# Patient Record
Sex: MALE | Race: WHITE | Employment: OTHER | URBAN - METROPOLITAN AREA
[De-identification: names, ages, dates, MRNs, and addresses within clinical notes are randomized per-mention and may not be internally consistent; named-entity substitution may affect disease eponyms.]

---

## 2021-04-23 ENCOUNTER — APPOINTMENT (OUTPATIENT)
Dept: CT IMAGING | Age: 56
DRG: 177 | End: 2021-04-23
Attending: EMERGENCY MEDICINE
Payer: COMMERCIAL

## 2021-04-23 ENCOUNTER — APPOINTMENT (OUTPATIENT)
Dept: GENERAL RADIOLOGY | Age: 56
DRG: 177 | End: 2021-04-23
Attending: EMERGENCY MEDICINE
Payer: COMMERCIAL

## 2021-04-23 ENCOUNTER — HOSPITAL ENCOUNTER (INPATIENT)
Age: 56
LOS: 4 days | Discharge: HOME | DRG: 177 | End: 2021-04-27
Attending: EMERGENCY MEDICINE | Admitting: FAMILY MEDICINE
Payer: COMMERCIAL

## 2021-04-23 DIAGNOSIS — R77.8 ELEVATED TROPONIN: ICD-10-CM

## 2021-04-23 DIAGNOSIS — I26.09 OTHER ACUTE PULMONARY EMBOLISM WITH ACUTE COR PULMONALE (HCC): ICD-10-CM

## 2021-04-23 DIAGNOSIS — U07.1 PNEUMONIA DUE TO COVID-19 VIRUS: Primary | ICD-10-CM

## 2021-04-23 DIAGNOSIS — J12.82 PNEUMONIA DUE TO COVID-19 VIRUS: Primary | ICD-10-CM

## 2021-04-23 DIAGNOSIS — N17.9 AKI (ACUTE KIDNEY INJURY) (HCC): ICD-10-CM

## 2021-04-23 DIAGNOSIS — I50.9 ACUTE HEART FAILURE, UNSPECIFIED HEART FAILURE TYPE (HCC): ICD-10-CM

## 2021-04-23 PROBLEM — I26.99 ACUTE PULMONARY EMBOLISM (HCC): Status: ACTIVE | Noted: 2021-04-23

## 2021-04-23 LAB
ALBUMIN SERPL-MCNC: 3 G/DL (ref 3.5–5)
ALBUMIN/GLOB SERPL: 0.7 {RATIO} (ref 1.1–2.2)
ALP SERPL-CCNC: 77 U/L (ref 45–117)
ALT SERPL-CCNC: 25 U/L (ref 12–78)
ANION GAP SERPL CALC-SCNC: 15 MMOL/L (ref 5–15)
APTT PPP: 23.1 SEC (ref 22.1–31)
APTT PPP: 43.5 SEC (ref 22.1–31)
AST SERPL-CCNC: 18 U/L (ref 15–37)
ATRIAL RATE: 79 BPM
BASOPHILS # BLD: 0 K/UL (ref 0–0.1)
BASOPHILS NFR BLD: 0 % (ref 0–1)
BILIRUB SERPL-MCNC: 0.8 MG/DL (ref 0.2–1)
BNP SERPL-MCNC: 5160 PG/ML (ref 0–125)
BUN SERPL-MCNC: 27 MG/DL (ref 6–20)
BUN/CREAT SERPL: 14 (ref 12–20)
CALCIUM SERPL-MCNC: 8 MG/DL (ref 8.5–10.1)
CALCULATED P AXIS, ECG09: 24 DEGREES
CALCULATED R AXIS, ECG10: 30 DEGREES
CALCULATED T AXIS, ECG11: 2 DEGREES
CHLORIDE SERPL-SCNC: 100 MMOL/L (ref 97–108)
CO2 SERPL-SCNC: 21 MMOL/L (ref 21–32)
COMMENT, HOLDF: NORMAL
CREAT SERPL-MCNC: 1.88 MG/DL (ref 0.7–1.3)
CRP SERPL-MCNC: 9.8 MG/DL
D DIMER PPP FEU-MCNC: 11.56 MG/L FEU (ref 0–0.65)
DIAGNOSIS, 93000: NORMAL
DIFFERENTIAL METHOD BLD: ABNORMAL
EOSINOPHIL # BLD: 0.1 K/UL (ref 0–0.4)
EOSINOPHIL NFR BLD: 1 % (ref 0–7)
ERYTHROCYTE [DISTWIDTH] IN BLOOD BY AUTOMATED COUNT: 12.6 % (ref 11.5–14.5)
ERYTHROCYTE [SEDIMENTATION RATE] IN BLOOD: 47 MM/HR (ref 0–20)
FERRITIN SERPL-MCNC: 78 NG/ML (ref 26–388)
GLOBULIN SER CALC-MCNC: 4.2 G/DL (ref 2–4)
GLUCOSE SERPL-MCNC: 175 MG/DL (ref 65–100)
HCT VFR BLD AUTO: 42.4 % (ref 36.6–50.3)
HGB BLD-MCNC: 13.9 G/DL (ref 12.1–17)
IMM GRANULOCYTES # BLD AUTO: 0.1 K/UL (ref 0–0.04)
IMM GRANULOCYTES NFR BLD AUTO: 1 % (ref 0–0.5)
LACTATE SERPL-SCNC: 1.4 MMOL/L (ref 0.4–2)
LACTATE SERPL-SCNC: 2.5 MMOL/L (ref 0.4–2)
LYMPHOCYTES # BLD: 1.1 K/UL (ref 0.8–3.5)
LYMPHOCYTES NFR BLD: 9 % (ref 12–49)
MCH RBC QN AUTO: 28.2 PG (ref 26–34)
MCHC RBC AUTO-ENTMCNC: 32.8 G/DL (ref 30–36.5)
MCV RBC AUTO: 86 FL (ref 80–99)
MONOCYTES # BLD: 0.7 K/UL (ref 0–1)
MONOCYTES NFR BLD: 6 % (ref 5–13)
NEUTS SEG # BLD: 9.5 K/UL (ref 1.8–8)
NEUTS SEG NFR BLD: 83 % (ref 32–75)
NRBC # BLD: 0 K/UL (ref 0–0.01)
NRBC BLD-RTO: 0 PER 100 WBC
P-R INTERVAL, ECG05: 176 MS
PLATELET # BLD AUTO: 166 K/UL (ref 150–400)
PMV BLD AUTO: 10.1 FL (ref 8.9–12.9)
POTASSIUM SERPL-SCNC: 3.7 MMOL/L (ref 3.5–5.1)
PROT SERPL-MCNC: 7.2 G/DL (ref 6.4–8.2)
Q-T INTERVAL, ECG07: 396 MS
QRS DURATION, ECG06: 86 MS
QTC CALCULATION (BEZET), ECG08: 454 MS
RBC # BLD AUTO: 4.93 M/UL (ref 4.1–5.7)
SAMPLES BEING HELD,HOLD: NORMAL
SODIUM SERPL-SCNC: 136 MMOL/L (ref 136–145)
THERAPEUTIC RANGE,PTTT: ABNORMAL SECS (ref 58–77)
THERAPEUTIC RANGE,PTTT: NORMAL SECS (ref 58–77)
TROPONIN I SERPL-MCNC: 0.16 NG/ML
VENTRICULAR RATE, ECG03: 79 BPM
WBC # BLD AUTO: 11.4 K/UL (ref 4.1–11.1)

## 2021-04-23 PROCEDURE — 85025 COMPLETE CBC W/AUTO DIFF WBC: CPT

## 2021-04-23 PROCEDURE — 74011250636 HC RX REV CODE- 250/636: Performed by: FAMILY MEDICINE

## 2021-04-23 PROCEDURE — 93005 ELECTROCARDIOGRAM TRACING: CPT

## 2021-04-23 PROCEDURE — 36415 COLL VENOUS BLD VENIPUNCTURE: CPT

## 2021-04-23 PROCEDURE — 82728 ASSAY OF FERRITIN: CPT

## 2021-04-23 PROCEDURE — 83605 ASSAY OF LACTIC ACID: CPT

## 2021-04-23 PROCEDURE — 71275 CT ANGIOGRAPHY CHEST: CPT

## 2021-04-23 PROCEDURE — 65660000000 HC RM CCU STEPDOWN

## 2021-04-23 PROCEDURE — 74011000258 HC RX REV CODE- 258: Performed by: FAMILY MEDICINE

## 2021-04-23 PROCEDURE — 85379 FIBRIN DEGRADATION QUANT: CPT

## 2021-04-23 PROCEDURE — 86140 C-REACTIVE PROTEIN: CPT

## 2021-04-23 PROCEDURE — 85652 RBC SED RATE AUTOMATED: CPT

## 2021-04-23 PROCEDURE — 74011250637 HC RX REV CODE- 250/637: Performed by: FAMILY MEDICINE

## 2021-04-23 PROCEDURE — 71045 X-RAY EXAM CHEST 1 VIEW: CPT

## 2021-04-23 PROCEDURE — 84484 ASSAY OF TROPONIN QUANT: CPT

## 2021-04-23 PROCEDURE — 85730 THROMBOPLASTIN TIME PARTIAL: CPT

## 2021-04-23 PROCEDURE — 80053 COMPREHEN METABOLIC PANEL: CPT

## 2021-04-23 PROCEDURE — 74011250636 HC RX REV CODE- 250/636: Performed by: EMERGENCY MEDICINE

## 2021-04-23 PROCEDURE — 74011000636 HC RX REV CODE- 636: Performed by: FAMILY MEDICINE

## 2021-04-23 PROCEDURE — 87040 BLOOD CULTURE FOR BACTERIA: CPT

## 2021-04-23 PROCEDURE — 83880 ASSAY OF NATRIURETIC PEPTIDE: CPT

## 2021-04-23 PROCEDURE — 99285 EMERGENCY DEPT VISIT HI MDM: CPT

## 2021-04-23 RX ORDER — ACETAMINOPHEN 325 MG/1
650 TABLET ORAL
Status: DISCONTINUED | OUTPATIENT
Start: 2021-04-23 | End: 2021-04-27 | Stop reason: HOSPADM

## 2021-04-23 RX ORDER — HEPARIN SODIUM 5000 [USP'U]/ML
40 INJECTION, SOLUTION INTRAVENOUS; SUBCUTANEOUS ONCE
Status: COMPLETED | OUTPATIENT
Start: 2021-04-23 | End: 2021-04-23

## 2021-04-23 RX ORDER — SODIUM CHLORIDE 9 MG/ML
75 INJECTION, SOLUTION INTRAVENOUS CONTINUOUS
Status: DISPENSED | OUTPATIENT
Start: 2021-04-23 | End: 2021-04-24

## 2021-04-23 RX ORDER — SODIUM CHLORIDE 0.9 % (FLUSH) 0.9 %
5-40 SYRINGE (ML) INJECTION AS NEEDED
Status: DISCONTINUED | OUTPATIENT
Start: 2021-04-23 | End: 2021-04-27 | Stop reason: HOSPADM

## 2021-04-23 RX ORDER — HEPARIN SODIUM 10000 [USP'U]/100ML
18-36 INJECTION, SOLUTION INTRAVENOUS
Status: DISCONTINUED | OUTPATIENT
Start: 2021-04-23 | End: 2021-04-23 | Stop reason: SDUPTHER

## 2021-04-23 RX ORDER — ASCORBIC ACID 500 MG
500 TABLET ORAL 2 TIMES DAILY
Status: DISCONTINUED | OUTPATIENT
Start: 2021-04-23 | End: 2021-04-27 | Stop reason: HOSPADM

## 2021-04-23 RX ORDER — ZINC SULFATE 50(220)MG
1 CAPSULE ORAL DAILY
Status: DISCONTINUED | OUTPATIENT
Start: 2021-04-24 | End: 2021-04-27 | Stop reason: HOSPADM

## 2021-04-23 RX ORDER — HEPARIN SODIUM 10000 [USP'U]/100ML
12-36 INJECTION, SOLUTION INTRAVENOUS
Status: DISCONTINUED | OUTPATIENT
Start: 2021-04-23 | End: 2021-04-26

## 2021-04-23 RX ORDER — ONDANSETRON 2 MG/ML
4 INJECTION INTRAMUSCULAR; INTRAVENOUS
Status: DISCONTINUED | OUTPATIENT
Start: 2021-04-23 | End: 2021-04-27 | Stop reason: HOSPADM

## 2021-04-23 RX ORDER — DEXAMETHASONE SODIUM PHOSPHATE 4 MG/ML
6 INJECTION, SOLUTION INTRA-ARTICULAR; INTRALESIONAL; INTRAMUSCULAR; INTRAVENOUS; SOFT TISSUE EVERY 24 HOURS
Status: DISCONTINUED | OUTPATIENT
Start: 2021-04-23 | End: 2021-04-27 | Stop reason: HOSPADM

## 2021-04-23 RX ORDER — HEPARIN SODIUM 5000 [USP'U]/ML
80 INJECTION, SOLUTION INTRAVENOUS; SUBCUTANEOUS ONCE
Status: COMPLETED | OUTPATIENT
Start: 2021-04-23 | End: 2021-04-23

## 2021-04-23 RX ORDER — SODIUM CHLORIDE 0.9 % (FLUSH) 0.9 %
5-10 SYRINGE (ML) INJECTION AS NEEDED
Status: DISCONTINUED | OUTPATIENT
Start: 2021-04-23 | End: 2021-04-27 | Stop reason: HOSPADM

## 2021-04-23 RX ORDER — ACETAMINOPHEN 650 MG/1
650 SUPPOSITORY RECTAL
Status: DISCONTINUED | OUTPATIENT
Start: 2021-04-23 | End: 2021-04-27 | Stop reason: HOSPADM

## 2021-04-23 RX ORDER — GUAIFENESIN/DEXTROMETHORPHAN 100-10MG/5
5 SYRUP ORAL
Status: DISCONTINUED | OUTPATIENT
Start: 2021-04-23 | End: 2021-04-27 | Stop reason: HOSPADM

## 2021-04-23 RX ORDER — SODIUM CHLORIDE 0.9 % (FLUSH) 0.9 %
5-40 SYRINGE (ML) INJECTION EVERY 8 HOURS
Status: DISCONTINUED | OUTPATIENT
Start: 2021-04-23 | End: 2021-04-27 | Stop reason: HOSPADM

## 2021-04-23 RX ADMIN — OXYCODONE HYDROCHLORIDE AND ACETAMINOPHEN 500 MG: 500 TABLET ORAL at 19:30

## 2021-04-23 RX ADMIN — HEPARIN SODIUM 4850 UNITS: 5000 INJECTION INTRAVENOUS; SUBCUTANEOUS at 22:14

## 2021-04-23 RX ADMIN — HEPARIN SODIUM 12 UNITS/KG/HR: 10000 INJECTION, SOLUTION INTRAVENOUS at 13:44

## 2021-04-23 RX ADMIN — DEXAMETHASONE SODIUM PHOSPHATE 6 MG: 4 INJECTION, SOLUTION INTRA-ARTICULAR; INTRALESIONAL; INTRAMUSCULAR; INTRAVENOUS; SOFT TISSUE at 19:30

## 2021-04-23 RX ADMIN — CEFTRIAXONE SODIUM 1 G: 1 INJECTION, POWDER, FOR SOLUTION INTRAMUSCULAR; INTRAVENOUS at 19:30

## 2021-04-23 RX ADMIN — AZITHROMYCIN MONOHYDRATE 500 MG: 500 INJECTION, POWDER, LYOPHILIZED, FOR SOLUTION INTRAVENOUS at 22:16

## 2021-04-23 RX ADMIN — SODIUM CHLORIDE 1000 ML: 9 INJECTION, SOLUTION INTRAVENOUS at 11:04

## 2021-04-23 RX ADMIN — IOPAMIDOL 80 ML: 755 INJECTION, SOLUTION INTRAVENOUS at 13:00

## 2021-04-23 RX ADMIN — SODIUM CHLORIDE 75 ML/HR: 9 INJECTION, SOLUTION INTRAVENOUS at 19:24

## 2021-04-23 RX ADMIN — HEPARIN SODIUM 9750 UNITS: 5000 INJECTION INTRAVENOUS; SUBCUTANEOUS at 13:42

## 2021-04-23 NOTE — ED PROVIDER NOTES
The history is provided by the patient. No  was used. Fatigue  This is a new problem. The current episode started more than 2 days ago. The problem has been gradually worsening. There was no focality noted. Pertinent negatives include no focal weakness, no loss of sensation, no loss of balance, no slurred speech, no speech difficulty, no memory loss, no movement disorder, no agitation, no visual change, no auditory change, no mental status change, no unresponsiveness and no disorientation. There has been no fever. Associated symptoms include shortness of breath. Pertinent negatives include no chest pain, no vomiting, no altered mental status, no confusion, no headaches, no choking, no nausea, no bowel incontinence and no bladder incontinence. Past Medical History:   Diagnosis Date    Hypertension        Past Surgical History:   Procedure Laterality Date    HX ORTHOPAEDIC      shoulder reconstruction         History reviewed. No pertinent family history.     Social History     Socioeconomic History    Marital status: Not on file     Spouse name: Not on file    Number of children: Not on file    Years of education: Not on file    Highest education level: Not on file   Occupational History    Not on file   Social Needs    Financial resource strain: Not on file    Food insecurity     Worry: Not on file     Inability: Not on file    Transportation needs     Medical: Not on file     Non-medical: Not on file   Tobacco Use    Smoking status: Never Smoker    Smokeless tobacco: Never Used   Substance and Sexual Activity    Alcohol use: Yes     Comment: social    Drug use: Never    Sexual activity: Not on file   Lifestyle    Physical activity     Days per week: Not on file     Minutes per session: Not on file    Stress: Not on file   Relationships    Social connections     Talks on phone: Not on file     Gets together: Not on file     Attends Hinduism service: Not on file Active member of club or organization: Not on file     Attends meetings of clubs or organizations: Not on file     Relationship status: Not on file    Intimate partner violence     Fear of current or ex partner: Not on file     Emotionally abused: Not on file     Physically abused: Not on file     Forced sexual activity: Not on file   Other Topics Concern    Not on file   Social History Narrative    Not on file         ALLERGIES: Patient has no known allergies. Review of Systems   Constitutional: Positive for fatigue. Negative for activity change, chills and fever. HENT: Negative for nosebleeds, sore throat, trouble swallowing and voice change. Eyes: Negative for visual disturbance. Respiratory: Positive for shortness of breath. Negative for choking. Cardiovascular: Negative for chest pain and palpitations. Gastrointestinal: Positive for diarrhea. Negative for abdominal pain, bowel incontinence, constipation, nausea and vomiting. Genitourinary: Negative for bladder incontinence, difficulty urinating, dysuria, hematuria and urgency. Musculoskeletal: Negative for back pain, neck pain and neck stiffness. Skin: Negative for color change. Allergic/Immunologic: Negative for immunocompromised state. Neurological: Negative for dizziness, focal weakness, seizures, syncope, speech difficulty, weakness, light-headedness, numbness, headaches and loss of balance. Psychiatric/Behavioral: Negative for agitation, behavioral problems, confusion, hallucinations, memory loss, self-injury and suicidal ideas. Vitals:    04/23/21 1024 04/23/21 1030   BP: (!) 84/70 90/70   Pulse: 85    Resp: 24    Temp: 97.8 °F (36.6 °C)    SpO2: 96% 95%   Weight: 121.8 kg (268 lb 8.3 oz)             Physical Exam  Vitals signs and nursing note reviewed. Constitutional:       General: He is not in acute distress. Appearance: He is well-developed. He is not diaphoretic.    HENT:      Head: Normocephalic and atraumatic. Eyes:      Pupils: Pupils are equal, round, and reactive to light. Neck:      Musculoskeletal: Normal range of motion and neck supple. Cardiovascular:      Rate and Rhythm: Normal rate and regular rhythm. Heart sounds: Normal heart sounds. No murmur. No friction rub. No gallop. Pulmonary:      Effort: Pulmonary effort is normal. No respiratory distress. Breath sounds: Normal breath sounds. No wheezing. Abdominal:      General: Bowel sounds are normal. There is no distension. Palpations: Abdomen is soft. Tenderness: There is no abdominal tenderness. There is no guarding or rebound. Musculoskeletal: Normal range of motion. Skin:     General: Skin is warm. Findings: No rash. Neurological:      Mental Status: He is alert and oriented to person, place, and time. Psychiatric:         Behavior: Behavior normal.         Thought Content: Thought content normal.         Judgment: Judgment normal.          MDM     This is a 14-year-old male with past medical history, review of systems, physical exam as above, presenting with complaints of shortness of breath, generalized weakness, loose bowel movements, in the setting of recent COVID-19 diagnosis. Patient states tested positive approximately a week ago. He endorses 3-5 loose bowel movements per day, gradual onset exertional dyspnea, progressing to dyspnea at rest, generalized weakness, and exercise intolerance. He endorses ongoing mild nonproductive cough, denies nausea, vomiting, abdominal pain. He states he has a special needs daughter who typically transfers and dresses, however states he is having more more difficulty doing that, stating that while ambulating to the bathroom he can no longer walk but more often crawls. He endorses a history of hypertension, noted to be hypotensive upon arrival, afebrile without tachycardia, satting 95% on room air he endorses dyspnea.   Differential includes sepsis, anemia, heart failure secondary to active Covid infection. Plan to obtain septic work-up, provide limited fluid bolus given possibility for heart failure, chest x-ray, inflammatory markers, D-dimer. We will reassess, and make a disposition, however I anticipate the patient will require admission for further care and evaluation. Procedures    Perfect Serve Consult for Admission  11:54 AM    ED Room Number: CAM28/14  Patient Name and age:  Fernando Bradshaw 61 y.o.  male  Working Diagnosis:   1. Pneumonia due to COVID-19 virus    2. Acute heart failure, unspecified heart failure type ([address]    3. CHRISTIN (acute kidney injury) ([address]    4. Elevated troponin        COVID-19 Suspicion:  yes  Sepsis present:  yes  Reassessment needed: yes  Code Status:  Full Code  Readmission: no  Isolation Requirements:  yes  Recommended Level of Care:  step down  Department:[facility] Adult ED - 21   Other:  D/w Dr. Ankit Reynafer    1:20 PM  Extensive PE on CTA per radiology, patient d/w with IR who states scattered PE unlikely to benefit from catheter directed therapy, recommends heparin. Hospitalist informed. IMPRESSION:  1. Pneumonia due to COVID-19 virus    2. Acute heart failure, unspecified heart failure type ([address]    3. CHRISTIN (acute kidney injury) ([address]    4. Elevated troponin    5.  Other acute pulmonary embolism with acute cor pulmonale (HCC)        - Broad Spectrum Antibiotics ordered: Viral illness  - Repeat lactic acid ordered for time 1300  - Re-assessment performed at time 1600 and clinical condition improving.    - Hypotension or Lactic Acidosis present (SBP<90, MAP<65, Lactate >4): NO IVF:  Not given due to concerns for volume overload  - Persistent Hypotension despite IVF resuscitation: NO  Vasopressors: Not indicated due to Septic Shock not present    Plan:  Admit to Step down    Total critical care time spent exclusive of procedures:  66 minutes    Cedric Proctor MD

## 2021-04-23 NOTE — ED NOTES
[facility] here to transport patient to [facility]. VSS. Respirations equal and unlabored on 2L NC. IV heparin infusing. Patient in no acute distress upon transfer.

## 2021-04-23 NOTE — H&P
[address] [facility] Adult  Hospitalist Group  History and Physical    Primary Care Provider: Ramya Matt MD  Date of Service:  4/23/2021    Subjective:     Kb Butterfield is a 61 y.o. male who presents via EMS with a chief complaint of ongoing sob and diarrhea x 2 weeks. Was tested at [facility], COVID + yesterday. He also reports fatigue. This is a new problem. The current episode started m2 weeks ago. The problem has been gradually worsening. There was no focality noted. D Dimer was elevated and CTA showing bilat PE. Pt started on heparin drip Pertinent negatives include no focal weakness, no loss of sensation, no loss of balance, no slurred speech, no speech difficulty, no memory loss, no movement disorder, no agitation, no visual change, no auditory change, no mental status change, no unresponsiveness and no disorientation. There has been no fever. Associated symptoms include shortness of breath. Pertinent negatives include no chest pain, no vomiting, no altered mental status, no confusion, no headaches, no choking, no nausea, no bowel incontinence and no bladder incontinence- but he has had diarrhea for over 1 week. Review of Systems:    A comprehensive review of systems was negative except for that written in the History of Present Illness.      Past Medical History:   Diagnosis Date    Hypertension       Past Surgical History:   Procedure Laterality Date    HX ORTHOPAEDIC      shoulder reconstruction     Home medications  -Patient take a blood pressure med- but cant remember the name    No Known Allergies      Family History- mother- HTN, heart issues- pacemaker, stents   Father- HTN, pacemaker   Sister- healthy   No FM of cancer, stroke, MI    SOCIAL HISTORY:  Patient resides at home with his wife- has 2 children- twins  Patient ambulates independently   Smoking history: denies  Alcohol history: rare- social use        Objective:     Patient Vitals for the past 24 hrs:   Temp Pulse Resp BP SpO2   04/23/21 1730 98.4 °F (36.9 °C) 85 20 113/66 98 %   04/23/21 1600 98.4 °F (36.9 °C) 85 23 107/73 96 %   04/23/21 1530 -- 85 26 103/74 --   04/23/21 1500 98.2 °F (36.8 °C) 86 26 107/75 100 %   04/23/21 1400 -- 82 25 96/76 95 %   04/23/21 1330 -- 78 21 104/70 95 %   04/23/21 1230 -- 81 20 96/69 --   04/23/21 1130 -- -- -- 94/66 --   04/23/21 1115 -- -- -- (!) 118/99 94 %   04/23/21 1100 -- -- -- 92/67 95 %   04/23/21 1045 -- -- -- 92/66 92 %   04/23/21 1030 -- -- -- 90/70 95 %   04/23/21 1024 97.8 °F (36.6 °C) 85 24 (!) 84/70 96 %     Physical Exam:   Visit Vitals  /78 (BP 1 Location: Left upper arm, BP Patient Position: At rest)   Pulse 70   Temp 97.8 °F (36.6 °C)   Resp 20   Wt 121.8 kg (268 lb 8.3 oz)   SpO2 97%     General:  Alert, cooperative, no distress, appears fatigued   Head:  Normocephalic, without obvious abnormality, atraumatic. Eyes:  Conjunctivae/corneas clear. PERRL, EOMs intact. Throat: Not examined   Neck: Supple, symmetrical, trachea midline, no adenopathy, thyroid: no enlargement/tenderness/nodules, no carotid bruit and no JVD. Back:   Symmetric, no curvature. ROM normal. No CVA tenderness. Lungs:   Clear to auscultation bilaterally. Chest wall:  No tenderness or deformity. Heart:  Regular rate and rhythm, S1, S2 normal, no murmur,    Abdomen:   Soft, non-tender. Bowel sounds normal. No masses,   Extremities: Extremities normal, atraumatic, no cyanosis or edema. Pulses: 2+ and symmetric all extremities. Skin: Skin color, texture, turgor normal. No rashes or lesions   Lymph nodes: Cervical, supraclavicular, and axillary nodes normal.   Neurologic: CNII-XII intact. Normal strength, sensation throughout. All diagnostic labs and studies have been reviewed.     Recent Results (from the past 24 hour(s))   SAMPLES BEING HELD    Collection Time: 04/23/21 10:35 AM   Result Value Ref Range    SAMPLES BEING HELD  1 RED, 1 BLUE, 1 LAV, 1 GREEN, 1 GRAY ON ICE     COMMENT        Add-on orders for these samples will be processed based on acceptable specimen integrity and analyte stability, which may vary by analyte. CBC WITH AUTOMATED DIFF    Collection Time: 04/23/21 10:35 AM   Result Value Ref Range    WBC 11.4 (H) 4.1 - 11.1 K/uL    RBC 4.93 4.10 - 5.70 M/uL    HGB 13.9 12.1 - 17.0 g/dL    HCT 42.4 36.6 - 50.3 %    MCV 86.0 80.0 - 99.0 FL    MCH 28.2 26.0 - 34.0 PG    MCHC 32.8 30.0 - 36.5 g/dL    RDW 12.6 11.5 - 14.5 %    PLATELET 329 768 - 240 K/uL    MPV 10.1 8.9 - 12.9 FL    NRBC 0.0 0  WBC    ABSOLUTE NRBC 0.00 0.00 - 0.01 K/uL    NEUTROPHILS 83 (H) 32 - 75 %    LYMPHOCYTES 9 (L) 12 - 49 %    MONOCYTES 6 5 - 13 %    EOSINOPHILS 1 0 - 7 %    BASOPHILS 0 0 - 1 %    IMMATURE GRANULOCYTES 1 (H) 0.0 - 0.5 %    ABS. NEUTROPHILS 9.5 (H) 1.8 - 8.0 K/UL    ABS. LYMPHOCYTES 1.1 0.8 - 3.5 K/UL    ABS. MONOCYTES 0.7 0.0 - 1.0 K/UL    ABS. EOSINOPHILS 0.1 0.0 - 0.4 K/UL    ABS. BASOPHILS 0.0 0.0 - 0.1 K/UL    ABS. IMM. GRANS. 0.1 (H) 0.00 - 0.04 K/UL    DF AUTOMATED     LACTIC ACID    Collection Time: 04/23/21 10:35 AM   Result Value Ref Range    Lactic acid 2.5 (HH) 0.4 - 2.0 MMOL/L   TROPONIN I    Collection Time: 04/23/21 10:35 AM   Result Value Ref Range    Troponin-I, Qt. 0.16 (H) <4.26 ng/mL   METABOLIC PANEL, COMPREHENSIVE    Collection Time: 04/23/21 10:35 AM   Result Value Ref Range    Sodium 136 136 - 145 mmol/L    Potassium 3.7 3.5 - 5.1 mmol/L    Chloride 100 97 - 108 mmol/L    CO2 21 21 - 32 mmol/L    Anion gap 15 5 - 15 mmol/L    Glucose 175 (H) 65 - 100 mg/dL    BUN 27 (H) 6 - 20 MG/DL    Creatinine 1.88 (H) 0.70 - 1.30 MG/DL    BUN/Creatinine ratio 14 12 - 20      GFR est AA 45 (L) >60 ml/min/1.73m2    GFR est non-AA 37 (L) >60 ml/min/1.73m2    Calcium 8.0 (L) 8.5 - 10.1 MG/DL    Bilirubin, total 0.8 0.2 - 1.0 MG/DL    ALT (SGPT) 25 12 - 78 U/L    AST (SGOT) 18 15 - 37 U/L    Alk.  phosphatase 77 45 - 117 U/L    Protein, total 7.2 6.4 - 8.2 g/dL    Albumin 3.0 (L) 3.5 - 5.0 g/dL Globulin 4.2 (H) 2.0 - 4.0 g/dL    A-G Ratio 0.7 (L) 1.1 - 2.2     NT-PRO BNP    Collection Time: 04/23/21 10:35 AM   Result Value Ref Range    NT pro-BNP 5,160 (H) 0 - 125 PG/ML   SED RATE (ESR)    Collection Time: 04/23/21 10:35 AM   Result Value Ref Range    Sed rate, automated 47 (H) 0 - 20 mm/hr   C REACTIVE PROTEIN, QT    Collection Time: 04/23/21 10:35 AM   Result Value Ref Range    C-Reactive protein 9.80 (H) <0.60 mg/dL   D DIMER    Collection Time: 04/23/21 10:35 AM   Result Value Ref Range    D-dimer 11.56 (H) 0.00 - 0.65 mg/L FEU   FERRITIN    Collection Time: 04/23/21 10:35 AM   Result Value Ref Range    Ferritin 78 26 - 388 NG/ML   EKG, 12 LEAD, INITIAL    Collection Time: 04/23/21 12:10 PM   Result Value Ref Range    Ventricular Rate 79 BPM    Atrial Rate 79 BPM    P-R Interval 176 ms    QRS Duration 86 ms    Q-T Interval 396 ms    QTC Calculation (Bezet) 454 ms    Calculated P Axis 24 degrees    Calculated R Axis 30 degrees    Calculated T Axis 2 degrees    Diagnosis       ** Poor data quality, interpretation may be adversely affected  Normal sinus rhythm    No previous ECGs available  Confirmed by Oanh Bojorquez M.D., Darina Amend ([id number]) on 4/23/2021 5:51:19 PM     PTT    Collection Time: 04/23/21  1:41 PM   Result Value Ref Range    aPTT 23.1 22.1 - 31.0 sec    aPTT, therapeutic range     58.0 - 77.0 SECS   LACTIC ACID    Collection Time: 04/23/21  1:41 PM   Result Value Ref Range    Lactic acid 1.4 0.4 - 2.0 MMOL/L     CT Results  (Last 48 hours)               04/23/21 1257  CTA CHEST W OR W WO CONT Final result    Impression:  1. Severe bilateral pulmonary emboli with evidence for severe right heart   strain. This case was discussed with Dr. Yasmin Malave. .   2. Bilateral Covid 19 pneumonia is noted. Narrative: Indication: Covid pneumonia, evaluate for pulmonary embolism.        COMPARISON: Chest radiograph 4/23/2021       A precontrast localizer was utilized to determine the level of the pulmonary   arteries. 2.5 mm axial thin sections were obtained following the rapid bolus   administration of 80 cc Isovue-370. Coronal and sagittal reformatted images were   performed. . 3D post processing was performed including MIP imaging. CT dose   reduction was achieved through use of a standardized protocol tailored for this   examination and automatic exposure control for dose modulation. The emergency room was notified. The main pulmonary segment is enlarged measuring 35 mm. There is extensive   bilateral pulmonary emboli. There are pulmonary emboli in both lower lobes that   cause near complete occlusion. There are also pulmonary emboli identified in the   lingula, left upper lobe and right upper lobe and right middle lobe. There is marked enlargement right ventricle diameter right ventricle 6.2 cm the   diameter of the left ventricle is 2.6 cm. There is reflux of contrast material   into the inferior vena cava and hepatic veins. These findings are consistent   with significant right heart strain. The lungs demonstrate bilateral peripheral subpleural areas of groundglass   opacity and consolidation all lobes are involved this is consistent with Covid   19 pneumonia. The central airways are patent. There are very minimal pleural effusions. Thyroid gland is unremarkable. No adenopathy. Imaging through the upper abdomen reveals no adrenal lesions. Osseous structures reveal diffuse idiopathic skeletal hyperostosis. Assessment:     Active Problems:    Pneumonia due to COVID-19 virus (4/23/2021)        Plan:     1. Acute Covid 19 pneumonia-currently on oxygen but no hypoxemia has been documented  Start zinc, vit C, steroids for elevated CRP, heparin for PE, antibiotics, consider remdesivir if hypoxemia documented  Do not see a need for inhalers at this time- pulmonary consult in the AM tomorrow  2.  Bilateral Pulmonary embolism- with evidence of R heart strain  IR was consulted by ER MD and they did not recommend any acute intervention but did recommend Heparin Drip  Did not order an ECHO due to stable BP and So2 and + COVID status  Which was started in the ER and will continue inpatient - consult Pulm for further tx recs  3. HTN with minimal elevation of troponin  - monitor BP and resume home med once we know what he was taking  EKG neg for MI, NSR, repeat troponin in am tomorrow  Minimal elevation likely due to heart strain and low SO2 levels from PE/pneumonia  4. Elevated creatinine- give 1L of fluid and recheck creatinine  Avoid exccessive fluids due to elevated BNP  5.  Diarrhea- likely COVID related- monitor    Full Code  Surrogate decisionmaker- patient's wife    Signed By: Manish Shields MD     April 23, 2021

## 2021-04-23 NOTE — ED TRIAGE NOTES
TRIAGE NOTE: Pt arrives via EMS for ongoing sob and diarrhea x 2 weeks. Was tested at [facility], COVID + yesterday.       Denies fever/ chills

## 2021-04-23 NOTE — PROGRESS NOTES
Pt received from transport from [facility] ED. A&Ox4, moex4, VSS. No complaints at this time. Will continue to monitor.

## 2021-04-23 NOTE — ED NOTES
TRANSFER - OUT REPORT:    Verbal report given to Chantal MCGUIRE(name) on Karla Gaona  being transferred to 207(unit) for routine progression of care       Report consisted of patients Situation, Background, Assessment and   Recommendations(SBAR). Information from the following report(s) SBAR, Kardex, ED Summary, Intake/Output, MAR, Recent Results and Cardiac Rhythm nsr was reviewed with the receiving nurse. Lines:   Peripheral IV 04/23/21 Right Antecubital (Active)       Peripheral IV 04/23/21 Left Antecubital (Active)        Opportunity for questions and clarification was provided.       Patient transported with:   Monitor  O2 @ 2 liters

## 2021-04-24 ENCOUNTER — APPOINTMENT (OUTPATIENT)
Dept: VASCULAR SURGERY | Age: 56
DRG: 177 | End: 2021-04-24
Attending: FAMILY MEDICINE
Payer: COMMERCIAL

## 2021-04-24 LAB
25(OH)D3 SERPL-MCNC: 11 NG/ML (ref 30–100)
ALBUMIN SERPL-MCNC: 2.8 G/DL (ref 3.5–5)
ALBUMIN/GLOB SERPL: 0.7 {RATIO} (ref 1.1–2.2)
ALP SERPL-CCNC: 82 U/L (ref 45–117)
ALT SERPL-CCNC: 26 U/L (ref 12–78)
ANION GAP SERPL CALC-SCNC: 8 MMOL/L (ref 5–15)
APTT PPP: 39.7 SEC (ref 22.1–31)
APTT PPP: 55.4 SEC (ref 22.1–31)
AST SERPL-CCNC: 17 U/L (ref 15–37)
BASOPHILS # BLD: 0 K/UL (ref 0–0.1)
BASOPHILS NFR BLD: 0 % (ref 0–1)
BILIRUB SERPL-MCNC: 0.6 MG/DL (ref 0.2–1)
BNP SERPL-MCNC: 4742 PG/ML
BUN SERPL-MCNC: 27 MG/DL (ref 6–20)
BUN/CREAT SERPL: 24 (ref 12–20)
CALCIUM SERPL-MCNC: 8 MG/DL (ref 8.5–10.1)
CHLORIDE SERPL-SCNC: 109 MMOL/L (ref 97–108)
CO2 SERPL-SCNC: 19 MMOL/L (ref 21–32)
CREAT SERPL-MCNC: 1.13 MG/DL (ref 0.7–1.3)
CRP SERPL-MCNC: 7.24 MG/DL (ref 0–0.6)
DIFFERENTIAL METHOD BLD: ABNORMAL
EOSINOPHIL # BLD: 0 K/UL (ref 0–0.4)
EOSINOPHIL NFR BLD: 0 % (ref 0–7)
ERYTHROCYTE [DISTWIDTH] IN BLOOD BY AUTOMATED COUNT: 12.7 % (ref 11.5–14.5)
GLOBULIN SER CALC-MCNC: 3.9 G/DL (ref 2–4)
GLUCOSE SERPL-MCNC: 151 MG/DL (ref 65–100)
HCT VFR BLD AUTO: 39.5 % (ref 36.6–50.3)
HGB BLD-MCNC: 12.9 G/DL (ref 12.1–17)
IMM GRANULOCYTES # BLD AUTO: 0 K/UL (ref 0–0.04)
IMM GRANULOCYTES NFR BLD AUTO: 0 % (ref 0–0.5)
LYMPHOCYTES # BLD: 0.9 K/UL (ref 0.8–3.5)
LYMPHOCYTES NFR BLD: 9 % (ref 12–49)
MCH RBC QN AUTO: 28.5 PG (ref 26–34)
MCHC RBC AUTO-ENTMCNC: 32.7 G/DL (ref 30–36.5)
MCV RBC AUTO: 87.2 FL (ref 80–99)
MONOCYTES # BLD: 0.4 K/UL (ref 0–1)
MONOCYTES NFR BLD: 4 % (ref 5–13)
NEUTS SEG # BLD: 8 K/UL (ref 1.8–8)
NEUTS SEG NFR BLD: 87 % (ref 32–75)
NRBC # BLD: 0 K/UL (ref 0–0.01)
NRBC BLD-RTO: 0 PER 100 WBC
PLATELET # BLD AUTO: 163 K/UL (ref 150–400)
PMV BLD AUTO: 10 FL (ref 8.9–12.9)
POTASSIUM SERPL-SCNC: 4.1 MMOL/L (ref 3.5–5.1)
PROCALCITONIN SERPL-MCNC: 0.17 NG/ML
PROT SERPL-MCNC: 6.7 G/DL (ref 6.4–8.2)
RBC # BLD AUTO: 4.53 M/UL (ref 4.1–5.7)
SODIUM SERPL-SCNC: 136 MMOL/L (ref 136–145)
THERAPEUTIC RANGE,PTTT: ABNORMAL SECS (ref 58–77)
THERAPEUTIC RANGE,PTTT: ABNORMAL SECS (ref 58–77)
TROPONIN I SERPL-MCNC: 0.08 NG/ML
WBC # BLD AUTO: 9.3 K/UL (ref 4.1–11.1)

## 2021-04-24 PROCEDURE — 93970 EXTREMITY STUDY: CPT

## 2021-04-24 PROCEDURE — 74011000258 HC RX REV CODE- 258: Performed by: FAMILY MEDICINE

## 2021-04-24 PROCEDURE — 84145 PROCALCITONIN (PCT): CPT

## 2021-04-24 PROCEDURE — 84484 ASSAY OF TROPONIN QUANT: CPT

## 2021-04-24 PROCEDURE — 85730 THROMBOPLASTIN TIME PARTIAL: CPT

## 2021-04-24 PROCEDURE — 85025 COMPLETE CBC W/AUTO DIFF WBC: CPT

## 2021-04-24 PROCEDURE — 74011250636 HC RX REV CODE- 250/636: Performed by: INTERNAL MEDICINE

## 2021-04-24 PROCEDURE — 83880 ASSAY OF NATRIURETIC PEPTIDE: CPT

## 2021-04-24 PROCEDURE — 74011250636 HC RX REV CODE- 250/636: Performed by: FAMILY MEDICINE

## 2021-04-24 PROCEDURE — 36415 COLL VENOUS BLD VENIPUNCTURE: CPT

## 2021-04-24 PROCEDURE — 86140 C-REACTIVE PROTEIN: CPT

## 2021-04-24 PROCEDURE — 74011250637 HC RX REV CODE- 250/637: Performed by: INTERNAL MEDICINE

## 2021-04-24 PROCEDURE — 65660000000 HC RM CCU STEPDOWN

## 2021-04-24 PROCEDURE — 82306 VITAMIN D 25 HYDROXY: CPT

## 2021-04-24 PROCEDURE — 80053 COMPREHEN METABOLIC PANEL: CPT

## 2021-04-24 PROCEDURE — 74011250636 HC RX REV CODE- 250/636: Performed by: EMERGENCY MEDICINE

## 2021-04-24 PROCEDURE — 74011250637 HC RX REV CODE- 250/637: Performed by: FAMILY MEDICINE

## 2021-04-24 RX ORDER — LISINOPRIL 20 MG/1
20 TABLET ORAL DAILY
Status: DISCONTINUED | OUTPATIENT
Start: 2021-04-24 | End: 2021-04-25

## 2021-04-24 RX ORDER — PSEUDOEPHED/ACETAMINOPHEN/CPM 30-500-2MG
2 TABLET ORAL
Status: DISCONTINUED | OUTPATIENT
Start: 2021-04-24 | End: 2021-04-27 | Stop reason: HOSPADM

## 2021-04-24 RX ORDER — HEPARIN SODIUM 5000 [USP'U]/ML
9744 INJECTION, SOLUTION INTRAVENOUS; SUBCUTANEOUS ONCE
Status: COMPLETED | OUTPATIENT
Start: 2021-04-24 | End: 2021-04-24

## 2021-04-24 RX ORDER — LISINOPRIL 20 MG/1
20 TABLET ORAL DAILY
COMMUNITY
End: 2021-04-27

## 2021-04-24 RX ADMIN — DEXAMETHASONE SODIUM PHOSPHATE 6 MG: 4 INJECTION, SOLUTION INTRA-ARTICULAR; INTRALESIONAL; INTRAMUSCULAR; INTRAVENOUS; SOFT TISSUE at 18:10

## 2021-04-24 RX ADMIN — Medication 10 ML: at 06:24

## 2021-04-24 RX ADMIN — HEPARIN SODIUM 9744 UNITS: 5000 INJECTION INTRAVENOUS; SUBCUTANEOUS at 16:40

## 2021-04-24 RX ADMIN — LOPERAMIDE HCL 2 MG: 1 SOLUTION ORAL at 19:47

## 2021-04-24 RX ADMIN — OXYCODONE HYDROCHLORIDE AND ACETAMINOPHEN 500 MG: 500 TABLET ORAL at 09:34

## 2021-04-24 RX ADMIN — Medication 10 ML: at 00:40

## 2021-04-24 RX ADMIN — Medication 1 CAPSULE: at 09:34

## 2021-04-24 RX ADMIN — HEPARIN SODIUM 14 UNITS/KG/HR: 10000 INJECTION, SOLUTION INTRAVENOUS at 06:17

## 2021-04-24 RX ADMIN — HEPARIN SODIUM 19 UNITS/KG/HR: 10000 INJECTION, SOLUTION INTRAVENOUS at 19:02

## 2021-04-24 RX ADMIN — LISINOPRIL 20 MG: 20 TABLET ORAL at 18:10

## 2021-04-24 RX ADMIN — ONDANSETRON 4 MG: 2 INJECTION INTRAMUSCULAR; INTRAVENOUS at 18:32

## 2021-04-24 RX ADMIN — AZITHROMYCIN MONOHYDRATE 500 MG: 500 INJECTION, POWDER, LYOPHILIZED, FOR SOLUTION INTRAVENOUS at 18:11

## 2021-04-24 RX ADMIN — OXYCODONE HYDROCHLORIDE AND ACETAMINOPHEN 500 MG: 500 TABLET ORAL at 18:10

## 2021-04-24 RX ADMIN — CEFTRIAXONE SODIUM 1 G: 1 INJECTION, POWDER, FOR SOLUTION INTRAMUSCULAR; INTRAVENOUS at 18:11

## 2021-04-24 NOTE — PROGRESS NOTES
Progress Note    Patient: Sanjiv Butts MRN: [id number]  SSN: [id number]    YOB: 1965  Age: 61 y.o. Sex: male      Admit Date: 4/23/2021    LOS: 2 days      Assessment/plan:  1. Acute bilateral pulmonary embolism. Per radiology report, CT angiogram of the chest showed right heart strain. Case was discussed by emergency department physician, Dr. Nicole Garcia, interventional radiologist who stated that extensive  PE scattered and unlikely to benefit from catheter directed therapy and recommended heparin. Patient remains on IV heparin. Request pulmonology consultation. Request echocardiogram.  2.  COVID-19 virus infection. Patient is on supplemental oxygen and qualifies for Decadron. He is currently on dexamethasone 6 mg IV daily feel that pulmonary embolism contributed significantly to symptoms of dyspnea. Continue contact isolation, droplet isolation. Patient has slightly elevated procalcitonin level of 0.08, and thus has been placed on antibiotics for  superimposed bacterial pulmonary infection. Continue zinc and vitamin C.  3.  Class II obesity. Patient is a candidate for obstructive sleep apnea. 4.  Essential hypertension. Continue home medication of lisinopril 10 mg p.o. daily.       Current Facility-Administered Medications:     lisinopriL (PRINIVIL, ZESTRIL) tablet 20 mg, 20 mg, Oral, DAILY, Caprice Lav H, DO, 20 mg at 04/24/21 1810    loperamide (IMODIUM) 1 mg/7.5 mL oral solution 2 mg, 2 mg, Oral, QID PRN, Caprice Lav H, DO, 2 mg at 04/24/21 1947    sodium chloride (NS) flush 5-10 mL, 5-10 mL, IntraVENous, PRN, DiskinBrianna MD    heparin 25,000 units in D5W 250 ml infusion, 12-36 Units/kg/hr, IntraVENous, TITRATE, DiskinBrianna MD, Stopped at 04/25/21 0005    sodium chloride (NS) flush 5-40 mL, 5-40 mL, IntraVENous, Q8H, Setwart Villanueva MD, 10 mL at 04/24/21 0624    sodium chloride (NS) flush 5-40 mL, 5-40 mL, IntraVENous, PRN, MD Ovidio Brandonin acetaminophen (TYLENOL) tablet 650 mg, 650 mg, Oral, Q4H PRN **OR** acetaminophen (TYLENOL) suppository 650 mg, 650 mg, Rectal, Q6H PRN, Ruslan Amezcua MD    ondansetron [facility]) injection 4 mg, 4 mg, IntraVENous, Q4H PRN, Ruslan Amezcua MD, 4 mg at 04/24/21 1832    guaiFENesin-dextromethorphan (ROBITUSSIN DM) 100-10 mg/5 mL syrup 5 mL, 5 mL, Oral, Q4H PRN, Ruslan Amezcua MD    dexamethasone (DECADRON) 4 mg/mL injection 6 mg, 6 mg, IntraVENous, Q24H, Ruslan Amezcua MD, 6 mg at 04/24/21 1810    zinc sulfate (ZINCATE) 50 mg zinc (220 mg) capsule 1 Cap, 1 Cap, Oral, DAILY, Ruslan Amezcua MD, 1 Cap at 04/24/21 7475    ascorbic acid (vitamin C) (VITAMIN C) tablet 500 mg, 500 mg, Oral, BID, Ruslan Amezcua MD, 500 mg at 04/24/21 1810    cefTRIAXone (ROCEPHIN) 1 g in 0.9% sodium chloride (MBP/ADV) 50 mL MBP, 1 g, IntraVENous, Q24H, Ruslan Amezcua MD, Last Rate: 100 mL/hr at 04/24/21 1811, 1 g at 04/24/21 1811    azithromycin (ZITHROMAX) 500 mg in 0.9% sodium chloride 250 mL (VIAL-MATE), 500 mg, IntraVENous, Q24H, Ruslan Amezcua MD, 500 mg at 04/24/21 1811    Subjective:   Patient was admitted with acute respiratory failure with hypoxia. He was diagnosed with COVID-19 based on outpatient COVID-19 test on Wednesday, April 21, 2021. Patient states that he decided to come to the emergency department on Friday, 4/23/2021, yesterday, because he was becoming so short of breath that he could hardly walk. Patient states that he has had a cough which remains nonproductive. Otherwise, he has had diarrhea 2-3 times a day approximately 1 week. Patient has had fatigue for about 2 weeks. Patient states that he has not had any nausea, vomiting, fever, chills, chest pain, abdominal pain, headache, body aches, wheezing, melena, hematochezia. At time of examination, patient is on supplemental oxygen at 1.5 L/min. Patient requests Imodium for diarrhea.     Objective:     Vitals:    04/24/21 0131 04/24/21 0440 04/24/21 0909 04/24/21 1230   BP: 112/73 116/78 114/77 121/79   Pulse: 74 70 80 75   Resp: 20 20 18 16   Temp: 97.7 °F (36.5 °C) 97.8 °F (36.6 °C) 98.1 °F (36.7 °C) 98.2 °F (36.8 °C)   SpO2: 96% 97% 99% 98%   Oxygen therapy     1.5 L/min        Intake and Output:  Current Shift: No intake/output data recorded. Last three shifts: 04/23 0701 - 04/24 1900  In: 1480 [P.O.:480; I.V.:1000]  Out: 950 [Urine:950]    Physical Exam:   General: In no acute distress. Well developed, well nourished. Head: Normocephalic, atraumatic. Eyes: Anicteric sclera. PERRL. Extraocular muscles intact. ENT: External ears and nose appear normal.  Oral mucosa moist.  Neck: Supple. No jugular venous distention. Heart: Regular rate and rhythm. No murmurs appreciated. Chest: Symmetrical excursion. Clear to auscultation bilaterally. Abdomen: Soft, nontender. No abnormal distention. Bowel sounds are present throughout. Extremities: No gross deformities. No edema, no cyanosis. Feet are warm to touch. Neurological: No lateralizing deficits. Alert, oriented X3. Skin: No jaundice. No rashes. Lab/Data Review:  Recent Results (from the past 24 hour(s))   CBC WITH AUTOMATED DIFF    Collection Time: 04/24/21  4:35 AM   Result Value Ref Range    WBC 9.3 4.1 - 11.1 K/uL    RBC 4.53 4.10 - 5.70 M/uL    HGB 12.9 12.1 - 17.0 g/dL    HCT 39.5 36.6 - 50.3 %    MCV 87.2 80.0 - 99.0 FL    MCH 28.5 26.0 - 34.0 PG    MCHC 32.7 30.0 - 36.5 g/dL    RDW 12.7 11.5 - 14.5 %    PLATELET 659 516 - 233 K/uL    MPV 10.0 8.9 - 12.9 FL    NRBC 0.0 0  WBC    ABSOLUTE NRBC 0.00 0.00 - 0.01 K/uL    NEUTROPHILS 87 (H) 32 - 75 %    LYMPHOCYTES 9 (L) 12 - 49 %    MONOCYTES 4 (L) 5 - 13 %    EOSINOPHILS 0 0 - 7 %    BASOPHILS 0 0 - 1 %    IMMATURE GRANULOCYTES 0 0.0 - 0.5 %    ABS. NEUTROPHILS 8.0 1.8 - 8.0 K/UL    ABS. LYMPHOCYTES 0.9 0.8 - 3.5 K/UL    ABS. MONOCYTES 0.4 0.0 - 1.0 K/UL    ABS. EOSINOPHILS 0.0 0.0 - 0.4 K/UL    ABS.  BASOPHILS 0.0 0.0 - 0.1 K/UL    ABS. IMM. GRANS. 0.0 0.00 - 0.04 K/UL    DF AUTOMATED     METABOLIC PANEL, COMPREHENSIVE    Collection Time: 04/24/21  4:35 AM   Result Value Ref Range    Sodium 136 136 - 145 mmol/L    Potassium 4.1 3.5 - 5.1 mmol/L    Chloride 109 (H) 97 - 108 mmol/L    CO2 19 (L) 21 - 32 mmol/L    Anion gap 8 5 - 15 mmol/L    Glucose 151 (H) 65 - 100 mg/dL    BUN 27 (H) 6 - 20 MG/DL    Creatinine 1.13 0.70 - 1.30 MG/DL    BUN/Creatinine ratio 24 (H) 12 - 20      GFR est AA >60 >60 ml/min/1.73m2    GFR est non-AA >60 >60 ml/min/1.73m2    Calcium 8.0 (L) 8.5 - 10.1 MG/DL    Bilirubin, total 0.6 0.2 - 1.0 MG/DL    ALT (SGPT) 26 12 - 78 U/L    AST (SGOT) 17 15 - 37 U/L    Alk.  phosphatase 82 45 - 117 U/L    Protein, total 6.7 6.4 - 8.2 g/dL    Albumin 2.8 (L) 3.5 - 5.0 g/dL    Globulin 3.9 2.0 - 4.0 g/dL    A-G Ratio 0.7 (L) 1.1 - 2.2     PROCALCITONIN    Collection Time: 04/24/21  4:35 AM   Result Value Ref Range    Procalcitonin 0.17 ng/mL   C REACTIVE PROTEIN, QT    Collection Time: 04/24/21  4:35 AM   Result Value Ref Range    C-Reactive protein 7.24 (H) 0.00 - 0.60 mg/dL   TROPONIN I    Collection Time: 04/24/21  4:35 AM   Result Value Ref Range    Troponin-I, Qt. 0.08 (H) <0.05 ng/mL   VITAMIN D, 25 HYDROXY    Collection Time: 04/24/21  4:35 AM   Result Value Ref Range    Vitamin D 25-Hydroxy 11.0 (L) 30 - 100 ng/mL   NT-PRO BNP    Collection Time: 04/24/21  4:35 AM   Result Value Ref Range    NT pro-BNP 4,742 (H) <125 PG/ML   PTT    Collection Time: 04/24/21  4:35 AM   Result Value Ref Range    aPTT 55.4 (H) 22.1 - 31.0 sec    aPTT, therapeutic range     58.0 - 77.0 SECS   PTT    Collection Time: 04/24/21  2:47 PM   Result Value Ref Range    aPTT 39.7 (H) 22.1 - 31.0 sec    aPTT, therapeutic range     58.0 - 77.0 SECS         Signed By: Radha Lira DO     April 25, 2021

## 2021-04-24 NOTE — ACP (ADVANCE CARE PLANNING)
Advance Care Planning     Advance Care Planning (ACP) Physician/NP/PA Conversation      Date of Conversation: 4/23/2021  Conducted with: Patient with Decision Making Capacity    Healthcare Decision Maker: Patient  Surrogate decision maker- patient's wife    Care Preferences:    Hospitalization: \"If your health worsens and it becomes clear that your chance of recovery is unlikely, what would be your preference regarding hospitalization? \"  The patient would prefer hospitalization. Ventilation: \"If you were unable to breathe on your own and your chance of recovery was unlikely, what would be your preference about the use of a ventilator (breathing machine) if it was available to you? \"   The patient would desire the use of a ventilator. Resuscitation: \"In the event your heart stopped as a result of an underlying serious health condition, would you want attempts to be made to restart your heart, or would you prefer a natural death? \"   Yes, attempt to resuscitate.     Additional topics discussed: treatment goals    Conversation Outcomes / Follow-Up Plan:   ACP complete - no further action today  Reviewed DNR/DNI and patient elects Full Code (Attempt Resuscitation)       Length of Voluntary ACP Conversation in minutes:  5 min    Robyn Rondon MD

## 2021-04-24 NOTE — PROGRESS NOTES
.Bedside and Verbal shift change report given to Kira (oncoming nurse) by Marco Mullins (offgoing nurse). Report included the following information SBAR, Kardex and MAR.

## 2021-04-25 PROBLEM — I10 HTN (HYPERTENSION): Chronic | Status: ACTIVE | Noted: 2021-04-25

## 2021-04-25 PROBLEM — I26.09 ACUTE PULMONARY EMBOLISM WITH ACUTE COR PULMONALE (HCC): Status: ACTIVE | Noted: 2021-04-25

## 2021-04-25 PROBLEM — E66.9 OBESITY (BMI 30-39.9): Chronic | Status: ACTIVE | Noted: 2021-04-25

## 2021-04-25 LAB
ALBUMIN SERPL-MCNC: 2.6 G/DL (ref 3.5–5)
ALBUMIN/GLOB SERPL: 0.7 {RATIO} (ref 1.1–2.2)
ALP SERPL-CCNC: 77 U/L (ref 45–117)
ALT SERPL-CCNC: 33 U/L (ref 12–78)
ANION GAP SERPL CALC-SCNC: 7 MMOL/L (ref 5–15)
APTT PPP: 127.5 SEC (ref 22.1–31)
APTT PPP: 39.3 SEC (ref 22.1–31)
APTT PPP: 39.7 SEC (ref 22.1–31)
APTT PPP: 94.2 SEC (ref 22.1–31)
APTT PPP: 97.6 SEC (ref 22.1–31)
AST SERPL-CCNC: 21 U/L (ref 15–37)
BASOPHILS # BLD: 0 K/UL (ref 0–0.1)
BASOPHILS NFR BLD: 0 % (ref 0–1)
BILIRUB SERPL-MCNC: 0.5 MG/DL (ref 0.2–1)
BNP SERPL-MCNC: 2944 PG/ML
BUN SERPL-MCNC: 24 MG/DL (ref 6–20)
BUN/CREAT SERPL: 23 (ref 12–20)
CALCIUM SERPL-MCNC: 8 MG/DL (ref 8.5–10.1)
CHLORIDE SERPL-SCNC: 108 MMOL/L (ref 97–108)
CO2 SERPL-SCNC: 20 MMOL/L (ref 21–32)
CREAT SERPL-MCNC: 1.06 MG/DL (ref 0.7–1.3)
CRP SERPL-MCNC: 4.12 MG/DL (ref 0–0.6)
DIFFERENTIAL METHOD BLD: ABNORMAL
EOSINOPHIL # BLD: 0 K/UL (ref 0–0.4)
EOSINOPHIL NFR BLD: 0 % (ref 0–7)
ERYTHROCYTE [DISTWIDTH] IN BLOOD BY AUTOMATED COUNT: 12.5 % (ref 11.5–14.5)
GLOBULIN SER CALC-MCNC: 3.9 G/DL (ref 2–4)
GLUCOSE SERPL-MCNC: 196 MG/DL (ref 65–100)
HCT VFR BLD AUTO: 37 % (ref 36.6–50.3)
HGB BLD-MCNC: 12.1 G/DL (ref 12.1–17)
IMM GRANULOCYTES # BLD AUTO: 0.1 K/UL (ref 0–0.04)
IMM GRANULOCYTES NFR BLD AUTO: 1 % (ref 0–0.5)
LYMPHOCYTES # BLD: 0.6 K/UL (ref 0.8–3.5)
LYMPHOCYTES NFR BLD: 6 % (ref 12–49)
MCH RBC QN AUTO: 28 PG (ref 26–34)
MCHC RBC AUTO-ENTMCNC: 32.7 G/DL (ref 30–36.5)
MCV RBC AUTO: 85.6 FL (ref 80–99)
MONOCYTES # BLD: 0.3 K/UL (ref 0–1)
MONOCYTES NFR BLD: 3 % (ref 5–13)
NEUTS SEG # BLD: 9.6 K/UL (ref 1.8–8)
NEUTS SEG NFR BLD: 90 % (ref 32–75)
NRBC # BLD: 0 K/UL (ref 0–0.01)
NRBC BLD-RTO: 0 PER 100 WBC
PLATELET # BLD AUTO: 168 K/UL (ref 150–400)
PMV BLD AUTO: 10 FL (ref 8.9–12.9)
POTASSIUM SERPL-SCNC: 4 MMOL/L (ref 3.5–5.1)
PROT SERPL-MCNC: 6.5 G/DL (ref 6.4–8.2)
RBC # BLD AUTO: 4.32 M/UL (ref 4.1–5.7)
RBC MORPH BLD: ABNORMAL
SODIUM SERPL-SCNC: 135 MMOL/L (ref 136–145)
THERAPEUTIC RANGE,PTTT: ABNORMAL SECS (ref 58–77)
WBC # BLD AUTO: 10.6 K/UL (ref 4.1–11.1)

## 2021-04-25 PROCEDURE — 83880 ASSAY OF NATRIURETIC PEPTIDE: CPT

## 2021-04-25 PROCEDURE — 85730 THROMBOPLASTIN TIME PARTIAL: CPT

## 2021-04-25 PROCEDURE — 86140 C-REACTIVE PROTEIN: CPT

## 2021-04-25 PROCEDURE — 80053 COMPREHEN METABOLIC PANEL: CPT

## 2021-04-25 PROCEDURE — 74011250636 HC RX REV CODE- 250/636: Performed by: EMERGENCY MEDICINE

## 2021-04-25 PROCEDURE — 36415 COLL VENOUS BLD VENIPUNCTURE: CPT

## 2021-04-25 PROCEDURE — 74011250637 HC RX REV CODE- 250/637: Performed by: FAMILY MEDICINE

## 2021-04-25 PROCEDURE — 65660000000 HC RM CCU STEPDOWN

## 2021-04-25 PROCEDURE — 74011250636 HC RX REV CODE- 250/636: Performed by: INTERNAL MEDICINE

## 2021-04-25 PROCEDURE — 74011000258 HC RX REV CODE- 258: Performed by: FAMILY MEDICINE

## 2021-04-25 PROCEDURE — 74011250637 HC RX REV CODE- 250/637: Performed by: INTERNAL MEDICINE

## 2021-04-25 PROCEDURE — 85025 COMPLETE CBC W/AUTO DIFF WBC: CPT

## 2021-04-25 PROCEDURE — 74011250636 HC RX REV CODE- 250/636: Performed by: FAMILY MEDICINE

## 2021-04-25 RX ORDER — LISINOPRIL 5 MG/1
5 TABLET ORAL DAILY
Status: DISCONTINUED | OUTPATIENT
Start: 2021-04-25 | End: 2021-04-26

## 2021-04-25 RX ORDER — HEPARIN SODIUM 5000 [USP'U]/ML
80 INJECTION, SOLUTION INTRAVENOUS; SUBCUTANEOUS ONCE
Status: COMPLETED | OUTPATIENT
Start: 2021-04-25 | End: 2021-04-25

## 2021-04-25 RX ADMIN — Medication 10 ML: at 21:31

## 2021-04-25 RX ADMIN — Medication 10 ML: at 07:07

## 2021-04-25 RX ADMIN — DEXAMETHASONE SODIUM PHOSPHATE 6 MG: 4 INJECTION, SOLUTION INTRA-ARTICULAR; INTRALESIONAL; INTRAMUSCULAR; INTRAVENOUS; SOFT TISSUE at 19:06

## 2021-04-25 RX ADMIN — OXYCODONE HYDROCHLORIDE AND ACETAMINOPHEN 500 MG: 500 TABLET ORAL at 08:37

## 2021-04-25 RX ADMIN — AZITHROMYCIN MONOHYDRATE 500 MG: 500 INJECTION, POWDER, LYOPHILIZED, FOR SOLUTION INTRAVENOUS at 19:09

## 2021-04-25 RX ADMIN — Medication 10 ML: at 14:00

## 2021-04-25 RX ADMIN — CEFTRIAXONE SODIUM 1 G: 1 INJECTION, POWDER, FOR SOLUTION INTRAMUSCULAR; INTRAVENOUS at 19:08

## 2021-04-25 RX ADMIN — LISINOPRIL 5 MG: 5 TABLET ORAL at 08:37

## 2021-04-25 RX ADMIN — HEPARIN SODIUM 9750 UNITS: 5000 INJECTION INTRAVENOUS; SUBCUTANEOUS at 13:48

## 2021-04-25 RX ADMIN — HEPARIN SODIUM 16 UNITS/KG/HR: 10000 INJECTION, SOLUTION INTRAVENOUS at 12:52

## 2021-04-25 RX ADMIN — OXYCODONE HYDROCHLORIDE AND ACETAMINOPHEN 500 MG: 500 TABLET ORAL at 19:06

## 2021-04-25 RX ADMIN — Medication 1 CAPSULE: at 08:37

## 2021-04-25 NOTE — PROGRESS NOTES
Reviewed chart. Consult to follow. With ggo opacities and now with oxygen requirement, recommend patient to start remdesivir.   Defer to hospitalist to order medication

## 2021-04-25 NOTE — PROGRESS NOTES
Progress Note          Pt Name  Karen Cyr   Date of Birth 1965   Medical Record Number  [id number]      Age  61 y.o. PCP Jesus Alberto Sage MD   Admit date:  4/23/2021    Room Number  207/02  @ [facility]   Date of Service  4/25/2021     Admission Diagnoses:  COVID PNA   Bilateral Pulmonary Embolism      History of present illness (copied from H&P)  \" Karen Cyr is a 61 y.o. male who presents via EMS with a chief complaint of ongoing sob and diarrhea x 2 weeks.  Was tested at [facility], COVID + yesterday. He also reports fatigue. This is a new problem. The current episode started m2 weeks ago. The problem has been gradually worsening. There was no focality noted. D Dimer was elevated and CTA showing bilat PE. Pt started on heparin drip Pertinent negatives include no focal weakness, no loss of sensation, no loss of balance, no slurred speech, no speech difficulty, no memory loss, no movement disorder, no agitation, no visual change, no auditory change, no mental status change, no unresponsiveness and no disorientation. There has been no fever. Associated symptoms include shortness of breath. Pertinent negatives include no chest pain, no vomiting, no altered mental status, no confusion, no headaches, no choking, no nausea, no bowel incontinence and no bladder incontinence- but he has had diarrhea for over 1 week.  \"       Assessment and plan:     COVID 19 PNA -presenting with reported SOB and since then has required oxygen supplementation possibly contributed by cor pulmonale   Diarrhea most likely due to COVID 19 infection   · Continue Droplet + isolation   · Decadron x10 days   · Full anticoagulation with heparin due to PE +COVID 19 infection   · Empiric Abx due to slightly elevated biomarkers for possible bacterial component of PNA   · Oxygen supplementation   · Empiric Vit C + Zinc PO   · Pulmonology guidance appreciated - we will start Remdesivir      Bilateral severe Pulmonary Embolism   Right heart strain (noted on CTA) -possibly due to Covid 19 related coagulopathy   · Heparin IV therapeutic   · Echo ordered   · I will request vascular surgery to opine on the need for thrombectomy due to the clot burden and presentation with hypotension and type 2 MI   ·     Type 2 MI -due to   Acute Cor Pulmonale   Cardiogenic shock (at presentation in the ER due to PE )  · Treating underlying condition   · Echo report pending     Hx HTN -chronic   · We will lower the ACEI dose and hold parameters - pt is at high risk of deterioration including cardiogenic shock. CHRISTIN -improving   · Continue gentle IVF   · Monitor Chemistries daily     Obesity -chronic     Body mass index is 35.5 kg/m².  -          CODE STATUS   Full     Functional Status  Pt is  and lives with wife and children   He was independent with ADLs PTA     Surrogate decision maker:  Pt's wife    Prophylaxis   Heparin    Discharge Plan:  Home    Misc   Benefit:  Payor: [employer] / Plan: [address] [place] [address] / Product Type: PPO /    Isolation :  Droplet Plus   ADT status:  INPATIENT      Query   None noted today    Prognosis   Guarded    Social issues  Date  Comment                        Subjective Data     \"I feel short of breath when I walked to the bathroom \"  Review of Systems - History obtained from the patient  Respiratory ROS: positive for - cough and shortness of breath  Cardiovascular ROS: negative for - chest pain or palpitations    Objective Data       Comments  Pleasant gentleman lying in bed in no distress      Patient Vitals for the past 24 hrs:   BP   04/25/21 0505 98/68   04/25/21 0005 122/83   04/24/21 2152 112/71   04/24/21 1751 (!) 146/89   04/24/21 1230 121/79   04/24/21 0909 114/77      Patient Vitals for the past 24 hrs:   Pulse   04/25/21 0505 71   04/25/21 0005 70   04/24/21 2152 69   04/24/21 1751 72   04/24/21 1230 75   04/24/21 0909 80      Patient Vitals for the past 24 hrs:   Resp   04/25/21 0505 18   04/25/21 0005 18 04/24/21 2152 18   04/24/21 1751 16   04/24/21 1230 16   04/24/21 0909 18      Patient Vitals for the past 24 hrs:   Temp   04/25/21 0505 97.7 °F (36.5 °C)   04/25/21 0005 97.6 °F (36.4 °C)   04/24/21 2152 97.9 °F (36.6 °C)   04/24/21 1751 97.9 °F (36.6 °C)   04/24/21 1230 98.2 °F (36.8 °C)   04/24/21 0909 98.1 °F (36.7 °C)        SpO2 Readings from Last 6 Encounters:   04/25/21 97%       O2 Flow Rate (L/min): 2 l/min  O2 Device: Nasal cannula Body mass index is 35.5 kg/m². -  Wt Readings from Last 10 Encounters:   04/25/21 122.1 kg (269 lb 1.6 oz)        Intake/Output Summary (Last 24 hours) at 4/25/2021 0735  Last data filed at 4/24/2021 1448  Gross per 24 hour   Intake 480 ml   Output 500 ml   Net -20 ml         Physical Exam:             General:  Alert, cooperative,   well noursished,   well developed,   appears stated age    Ears/Eyes:  Hearing intact  Sclera anicteric.    Pupils equal   Mouth/Throat:  Mucous membranes normal pink and moist     Neck:     Lungs:  Chest excursion symmetrical   Auscultation B/L Symmetrical with   Vesicular breath sounds          CVS:  Regular rate and rhythm   no  murmur,   No click, rub or gallop  S1 normal   S2 normal   No parasternal heave   Pedal pulses  b/l symmetrical   Abdomen:  Obese  Soft, non-tender  Bowel sounds normal  No distension   Percussion note tympanitic   Extremities:    No cyanosis, jaundice  No edema noted   No sign of DVT/cord like lesion on palpation  No sign of acute trauma    Skin:    Skin color, texture, turgor normal. no acute rash or lesions    Lymph nodes:     Musculoskeletal Muscle bulk B/L symmetrical   Neuro Cranial nerves are intact,   motor movement b/l symmetrical,   Sensory evaluation b/l symmetrical    Psych:  Alert and oriented,   normal mood & affect          Medications reviewed     Current Facility-Administered Medications   Medication Dose Route Frequency    lisinopriL (PRINIVIL, ZESTRIL) tablet 20 mg  20 mg Oral DAILY    loperamide (IMODIUM) 1 mg/7.5 mL oral solution 2 mg  2 mg Oral QID PRN    sodium chloride (NS) flush 5-10 mL  5-10 mL IntraVENous PRN    heparin 25,000 units in D5W 250 ml infusion  12-36 Units/kg/hr IntraVENous TITRATE    sodium chloride (NS) flush 5-40 mL  5-40 mL IntraVENous Q8H    sodium chloride (NS) flush 5-40 mL  5-40 mL IntraVENous PRN    acetaminophen (TYLENOL) tablet 650 mg  650 mg Oral Q4H PRN    Or    acetaminophen (TYLENOL) suppository 650 mg  650 mg Rectal Q6H PRN    ondansetron (ZOFRAN) injection 4 mg  4 mg IntraVENous Q4H PRN    guaiFENesin-dextromethorphan (ROBITUSSIN DM) 100-10 mg/5 mL syrup 5 mL  5 mL Oral Q4H PRN    dexamethasone (DECADRON) 4 mg/mL injection 6 mg  6 mg IntraVENous Q24H    zinc sulfate (ZINCATE) 50 mg zinc (220 mg) capsule 1 Cap  1 Cap Oral DAILY    ascorbic acid (vitamin C) (VITAMIN C) tablet 500 mg  500 mg Oral BID    cefTRIAXone (ROCEPHIN) 1 g in 0.9% sodium chloride (MBP/ADV) 50 mL MBP  1 g IntraVENous Q24H    azithromycin (ZITHROMAX) 500 mg in 0.9% sodium chloride 250 mL (VIAL-MATE)  500 mg IntraVENous Q24H       Relevant other informations: Other medical conditions listed in [facility] problem list section; all of these and other pertinent data were taken into consideration when treatment plan is developed and customized to this patient's unique overall circumstances and needs. We have reviewed available old medical records within the constraints of this admission process.         Data Review:   Recent Days:  All Micro Results     Procedure Component Value Units Date/Time    CULTURE, BLOOD [[id number]] Collected: 04/23/21 1115    Order Status: Completed Specimen: Blood Updated: 04/25/21 0636     Special Requests: NO SPECIAL REQUESTS        Culture result: NO GROWTH 2 DAYS       CULTURE, BLOOD [[id number]] Collected: 04/23/21 1115    Order Status: Completed Specimen: Blood Updated: 04/25/21 0636     Special Requests: NO SPECIAL REQUESTS        Culture result: NO GROWTH 2 DAYS             Recent Labs     04/25/21 0224 04/24/21  0435 04/23/21  1035   WBC 10.6 9.3 11.4*   HGB 12.1 12.9 13.9   HCT 37.0 39.5 42.4    163 166     Recent Labs     04/25/21 0224 04/24/21  0435 04/23/21  1035   * 136 136   K 4.0 4.1 3.7    109* 100   CO2 20* 19* 21   * 151* 175*   BUN 24* 27* 27*   CREA 1.06 1.13 1.88*   CA 8.0* 8.0* 8.0*   ALB 2.6* 2.8* 3.0*   TBILI 0.5 0.6 0.8   ALT 33 26 25      No results found for: TSH, TSHEXT, TSHEXT         Care Plan discussed with:Patient/Family and Nurse   Other medical conditions are listed in the active hospital problem list section; these and other pertinent data were taken into consideration when the treatment plan was developed and customized to this patient's unique overall circumstances and needs. High complexity decision making was performed for this patient who is at high risk for decompensation with multiple organ involvement. Today total floor/unit time was 35 minutes while caring for this patient and greater than 50% of that time was spent with patient (and/or family) coordinating patients clinical issues; this includes time spent during multidisciplinary rounds.         Rickey Roberts MD MPH FACP    4/25/2021

## 2021-04-25 NOTE — PROGRESS NOTES
Problem: Falls - Risk of  Goal: *Absence of Falls  Description: Document Blanca Florence Fall Risk and appropriate interventions in the flowsheet.   Outcome: Progressing Towards Goal  Note: Fall Risk Interventions:                                Problem: Patient Education: Go to Patient Education Activity  Goal: Patient/Family Education  Outcome: Progressing Towards Goal

## 2021-04-25 NOTE — PROGRESS NOTES
I spoke with our pharmacist, who informed me that there has been a new policy change for dispensing Remdesivir. I will defer the final decision to our pulmonlogist or if necessary we can consult Infectious Disease specialist to weigh in.      Annalisa Salinas MD MPH FACP   3:24 PM  4/25/2021

## 2021-04-25 NOTE — PROGRESS NOTES
Pharmacy:  Per Hospital policy, Mr. Lu Rodriguez if not a candidate for Remdesivir as he has been symptomatic for >6 days. Dr. Verna Carrero and Dr. Vani Wynne have been advised of policy and need for infectious disease consult if they would like to pursue use of Remdesivir.   Thank you,  Tai Escoto

## 2021-04-26 ENCOUNTER — APPOINTMENT (OUTPATIENT)
Dept: GENERAL RADIOLOGY | Age: 56
DRG: 177 | End: 2021-04-26
Attending: INTERNAL MEDICINE
Payer: COMMERCIAL

## 2021-04-26 ENCOUNTER — APPOINTMENT (OUTPATIENT)
Dept: NON INVASIVE DIAGNOSTICS | Age: 56
DRG: 177 | End: 2021-04-26
Attending: INTERNAL MEDICINE
Payer: COMMERCIAL

## 2021-04-26 LAB
ALBUMIN SERPL-MCNC: 2.4 G/DL (ref 3.5–5)
ALBUMIN/GLOB SERPL: 0.7 {RATIO} (ref 1.1–2.2)
ALP SERPL-CCNC: 74 U/L (ref 45–117)
ALT SERPL-CCNC: 45 U/L (ref 12–78)
ANION GAP SERPL CALC-SCNC: 9 MMOL/L (ref 5–15)
APTT PPP: 39.2 SEC (ref 22.1–31)
AST SERPL-CCNC: 24 U/L (ref 15–37)
BASOPHILS # BLD: 0 K/UL (ref 0–0.1)
BASOPHILS NFR BLD: 0 % (ref 0–1)
BILIRUB SERPL-MCNC: 0.4 MG/DL (ref 0.2–1)
BNP SERPL-MCNC: 1743 PG/ML
BUN SERPL-MCNC: 21 MG/DL (ref 6–20)
BUN/CREAT SERPL: 21 (ref 12–20)
CALCIUM SERPL-MCNC: 7.8 MG/DL (ref 8.5–10.1)
CHLORIDE SERPL-SCNC: 109 MMOL/L (ref 97–108)
CO2 SERPL-SCNC: 17 MMOL/L (ref 21–32)
CREAT SERPL-MCNC: 0.98 MG/DL (ref 0.7–1.3)
CRP SERPL-MCNC: 1.93 MG/DL (ref 0–0.6)
DIFFERENTIAL METHOD BLD: ABNORMAL
ECHO AO ROOT DIAM: 4.6 CM
ECHO AV AREA PEAK VELOCITY: 4.02 CM2
ECHO AV AREA/BSA PEAK VELOCITY: 1.6 CM2/M2
ECHO AV PEAK GRADIENT: 7.09 MMHG
ECHO AV PEAK VELOCITY: 133.09 CM/S
ECHO EST RA PRESSURE: 3 MMHG
ECHO IVC PROX: 2.79 CM
ECHO LA MAJOR AXIS: 4.8 CM
ECHO LA MINOR AXIS: 1.96 CM
ECHO LV E' LATERAL VELOCITY: 9.61 CM/S
ECHO LV E' SEPTAL VELOCITY: 8.4 CM/S
ECHO LV INTERNAL DIMENSION DIASTOLIC: 5.4 CM (ref 4.2–5.9)
ECHO LV INTERNAL DIMENSION SYSTOLIC: 3.8 CM
ECHO LV IVSD: 1.31 CM (ref 0.6–1)
ECHO LV MASS 2D: 292.4 G (ref 88–224)
ECHO LV MASS INDEX 2D: 119.6 G/M2 (ref 49–115)
ECHO LV POSTERIOR WALL DIASTOLIC: 1.27 CM (ref 0.6–1)
ECHO LVOT DIAM: 2.38 CM
ECHO LVOT PEAK GRADIENT: 5.78 MMHG
ECHO LVOT PEAK VELOCITY: 120.17 CM/S
ECHO MV A VELOCITY: 63.36 CM/S
ECHO MV AREA PHT: 3.1 CM2
ECHO MV E DECELERATION TIME (DT): 245.08 MS
ECHO MV E VELOCITY: 53.42 CM/S
ECHO MV E/A RATIO: 0.84
ECHO MV E/E' LATERAL: 5.56
ECHO MV E/E' RATIO (AVERAGED): 5.96
ECHO MV E/E' SEPTAL: 6.36
ECHO MV PRESSURE HALF TIME (PHT): 71.07 MS
ECHO PV PEAK INSTANTANEOUS GRADIENT SYSTOLIC: 1.76 MMHG
ECHO PV REGURGITANT MAX VELOCITY: 66.35 CM/S
ECHO RV INTERNAL DIMENSION: 3.92 CM
ECHO RV TAPSE: 2.14 CM (ref 1.5–2)
EOSINOPHIL # BLD: 0 K/UL (ref 0–0.4)
EOSINOPHIL NFR BLD: 0 % (ref 0–7)
ERYTHROCYTE [DISTWIDTH] IN BLOOD BY AUTOMATED COUNT: 12.6 % (ref 11.5–14.5)
GLOBULIN SER CALC-MCNC: 3.6 G/DL (ref 2–4)
GLUCOSE SERPL-MCNC: 137 MG/DL (ref 65–100)
HCT VFR BLD AUTO: 36.6 % (ref 36.6–50.3)
HGB BLD-MCNC: 11.7 G/DL (ref 12.1–17)
IMM GRANULOCYTES # BLD AUTO: 0.1 K/UL (ref 0–0.04)
IMM GRANULOCYTES NFR BLD AUTO: 1 % (ref 0–0.5)
LYMPHOCYTES # BLD: 0.6 K/UL (ref 0.8–3.5)
LYMPHOCYTES NFR BLD: 7 % (ref 12–49)
MCH RBC QN AUTO: 28.3 PG (ref 26–34)
MCHC RBC AUTO-ENTMCNC: 32 G/DL (ref 30–36.5)
MCV RBC AUTO: 88.6 FL (ref 80–99)
MONOCYTES # BLD: 0.3 K/UL (ref 0–1)
MONOCYTES NFR BLD: 4 % (ref 5–13)
NEUTS SEG # BLD: 7.8 K/UL (ref 1.8–8)
NEUTS SEG NFR BLD: 88 % (ref 32–75)
NRBC # BLD: 0 K/UL (ref 0–0.01)
NRBC BLD-RTO: 0 PER 100 WBC
PLATELET # BLD AUTO: 181 K/UL (ref 150–400)
PMV BLD AUTO: 10 FL (ref 8.9–12.9)
POTASSIUM SERPL-SCNC: 4.1 MMOL/L (ref 3.5–5.1)
PROT SERPL-MCNC: 6 G/DL (ref 6.4–8.2)
RBC # BLD AUTO: 4.13 M/UL (ref 4.1–5.7)
SODIUM SERPL-SCNC: 135 MMOL/L (ref 136–145)
THERAPEUTIC RANGE,PTTT: ABNORMAL SECS (ref 58–77)
WBC # BLD AUTO: 8.9 K/UL (ref 4.1–11.1)

## 2021-04-26 PROCEDURE — 65660000000 HC RM CCU STEPDOWN

## 2021-04-26 PROCEDURE — 94760 N-INVAS EAR/PLS OXIMETRY 1: CPT

## 2021-04-26 PROCEDURE — 86140 C-REACTIVE PROTEIN: CPT

## 2021-04-26 PROCEDURE — 85730 THROMBOPLASTIN TIME PARTIAL: CPT

## 2021-04-26 PROCEDURE — 85025 COMPLETE CBC W/AUTO DIFF WBC: CPT

## 2021-04-26 PROCEDURE — 74011250637 HC RX REV CODE- 250/637: Performed by: INTERNAL MEDICINE

## 2021-04-26 PROCEDURE — 36415 COLL VENOUS BLD VENIPUNCTURE: CPT

## 2021-04-26 PROCEDURE — 93306 TTE W/DOPPLER COMPLETE: CPT

## 2021-04-26 PROCEDURE — 74011250637 HC RX REV CODE- 250/637: Performed by: FAMILY MEDICINE

## 2021-04-26 PROCEDURE — 83880 ASSAY OF NATRIURETIC PEPTIDE: CPT

## 2021-04-26 PROCEDURE — 93306 TTE W/DOPPLER COMPLETE: CPT | Performed by: SPECIALIST

## 2021-04-26 PROCEDURE — 74011250636 HC RX REV CODE- 250/636: Performed by: FAMILY MEDICINE

## 2021-04-26 PROCEDURE — 80053 COMPREHEN METABOLIC PANEL: CPT

## 2021-04-26 RX ORDER — ENOXAPARIN SODIUM 150 MG/ML
1 INJECTION SUBCUTANEOUS EVERY 12 HOURS
Status: DISCONTINUED | OUTPATIENT
Start: 2021-04-26 | End: 2021-04-26

## 2021-04-26 RX ORDER — HEPARIN SODIUM 5000 [USP'U]/ML
9744 INJECTION, SOLUTION INTRAVENOUS; SUBCUTANEOUS ONCE
Status: DISCONTINUED | OUTPATIENT
Start: 2021-04-26 | End: 2021-04-26

## 2021-04-26 RX ADMIN — Medication 10 ML: at 13:37

## 2021-04-26 RX ADMIN — ONDANSETRON 4 MG: 2 INJECTION INTRAMUSCULAR; INTRAVENOUS at 13:37

## 2021-04-26 RX ADMIN — Medication 10 ML: at 22:18

## 2021-04-26 RX ADMIN — OXYCODONE HYDROCHLORIDE AND ACETAMINOPHEN 500 MG: 500 TABLET ORAL at 09:46

## 2021-04-26 RX ADMIN — APIXABAN 10 MG: 5 TABLET, FILM COATED ORAL at 11:54

## 2021-04-26 RX ADMIN — LISINOPRIL 5 MG: 5 TABLET ORAL at 09:46

## 2021-04-26 RX ADMIN — DEXAMETHASONE SODIUM PHOSPHATE 6 MG: 4 INJECTION, SOLUTION INTRA-ARTICULAR; INTRALESIONAL; INTRAMUSCULAR; INTRAVENOUS; SOFT TISSUE at 18:23

## 2021-04-26 RX ADMIN — Medication 1 CAPSULE: at 09:46

## 2021-04-26 RX ADMIN — Medication 10 ML: at 05:42

## 2021-04-26 RX ADMIN — OXYCODONE HYDROCHLORIDE AND ACETAMINOPHEN 500 MG: 500 TABLET ORAL at 18:23

## 2021-04-26 RX ADMIN — APIXABAN 10 MG: 5 TABLET, FILM COATED ORAL at 22:18

## 2021-04-26 NOTE — PROGRESS NOTES
RODRICK:  1. RUR- 13%  2. Home with family assistance when stable. Care Management Interventions  Palliative Care Criteria Met (RRAT>21 & CHF Dx)?: No  Mode of Transport at Discharge: Other (see comment)  MyChart Signup: No  Discharge Durable Medical Equipment: No  Health Maintenance Reviewed: Yes  Physical Therapy Consult: No  Occupational Therapy Consult: No  Speech Therapy Consult: No  Current Support Network: Lives with Spouse  Confirm Follow Up Transport: Family  The Patient and/or Patient Representative was Provided with a Choice of Provider and Agrees with the Discharge Plan?: Yes   Resource Information Provided?: No  Discharge Location  Discharge Placement: Home with family assistance      Reason for Admission:  COVID-19                      RUR Score:      13%               Plan for utilizing home health:      No indication at this time. PCP: First and Last name:  Ant Gomes MD     Name of Practice:    Are you a current patient: Yes/No:  Yes    Approximate date of last visit: 2 months ago   Can you participate in a virtual visit with your PCP:  Yes                     Current Advanced Directive/Advance Care Plan: Full Code      Healthcare Decision Maker:   Click here to complete [employer] including selection of the Healthcare Decision Maker Relationship (ie \"Primary\")                             Transition of Care Plan:                        CM contacted patient via telephone due to contact precautions to explain role and offer support  Patient verified demographics including insurance and emergency contact information. He lives with his wife and their children in private residence. He is independent with ADLs and IADLs prior to admission, no DME use. He does not have any concerns for discharge. MD notified LIA that his wife is concerned for him coming home because their children will then be exposed. CM will speak with his wife.     Nasima Mesa [facility]

## 2021-04-26 NOTE — PROGRESS NOTES
Bedside and Verbal shift change report given to Nu Gardner RN (oncoming nurse) by Jodi Almanza RN (offgoing nurse). Report included the following information SBAR, Kardex, Intake/Output and MAR.

## 2021-04-26 NOTE — PROGRESS NOTES
Bedside shift change report given to [address] [place] [address] (oncoming nurse) by Asha Ponce (offgoing nurse). Report included the following information {SBAR REPORTS OPUQ:[id number]}.

## 2021-04-26 NOTE — PROGRESS NOTES
[address] called Marisabel De Jesus) about results from pt Heparin ptt/drip. Due to the large swings in results on ptt he advised to change pt drip rate (17->21) but to HOLD pt bolus (9750units). Told him we would consult the oncall np (Ely Lynn) and if he gives us the ok, we will hold the bolus. [id number] Carlson Np Ok'd holding bolus of heparin.

## 2021-04-26 NOTE — PROGRESS NOTES
[facility] Adult  Hospitalist Group                                                                                          Hospitalist Progress Note  Nyla Clements MD  Answering service: [phone] OR [phone] from in house phone        Date of Service:  2021  NAME:  Torres Zambrano  :  1965  MRN:  [id number]      Admission Summary:   Torres Zambrano is a 61 y.o. male who presents via EMS with a chief complaint of ongoing sob and diarrhea x 2 weeks. Interval history / Subjective:   Pt with improvement in shortness of breath. Saturating well on RA. Echo not yet done. Assessment & Plan:     Acute hypoxic respiratory failure - secondary to PE and COVID  COVID 19 PNA   - Continue dexamethasone   - VitC, Zn  - Not a candidate for remdesivir symtpom onset > 10 days   - DC antibiotics as procalcitonin is negative on admission  - O2 as needed for saturation over 90%    Bilateral PE - extensive clot burden  R heart strain on CT  - DC heparin gtt   - Start eliquis   - Echo ordered, will need repeat in 3 months as o/p     Type 2 NSTEMI  Cardiogenic shock  - From PE, resolved. HTN - DC lisinopril, BP remain wnl/low. Pt with some lightheadedness.        Code status: FULL  DVT prophylaxis: eliquis     Care Plan discussed with: Patient/Family  Anticipated Disposition: Home w/Family  Anticipated Discharge: Less than 24 hours     Hospital Problems  Date Reviewed: 2021          Codes Class Noted POA    HTN (hypertension) (Chronic) ICD-10-CM: I10  ICD-9-CM: 401.9  2021 Yes        Acute pulmonary embolism with acute cor pulmonale (HCC) ICD-10-CM: I26.09  ICD-9-CM: 415.19, 415.0  2021 Yes        Obesity (BMI 30-39.9) (Chronic) ICD-10-CM: E66.9  ICD-9-CM: 278.00  2021 Yes        * (Principal) Pneumonia due to COVID-19 virus ICD-10-CM: U07.1, J12.82  ICD-9-CM: 480.8, 079.89  2021 Yes        Acute pulmonary embolism ([address] ICD-10-CM: I26.99  ICD-9-CM: 415.19  2021 Yes COVID-19 virus infection ICD-10-CM: U07.1  ICD-9-CM: 079.89  4/23/2021 Yes                Review of Systems:   A comprehensive review of systems was negative except for that written in the HPI. Vital Signs:    Last 24hrs VS reviewed since prior progress note. Most recent are:  Visit Vitals  /74   Pulse 62   Temp 97.7 °F (36.5 °C)   Resp 18   Ht 6' 1\" (1.854 m)   Wt 122.5 kg (270 lb)   SpO2 95%   BMI 35.62 kg/m²         Intake/Output Summary (Last 24 hours) at 4/26/2021 1339  Last data filed at 4/26/2021 8955  Gross per 24 hour   Intake 1400 ml   Output --   Net 1400 ml        Physical Examination:     I had a face to face encounter with this patient and independently examined them on 4/26/2021 as outlined below:          Constitutional:  No acute distress, cooperative, pleasant    ENT:  Oral mucosa moist, oropharynx benign. Resp:  CTA bilaterally. No wheezing/rhonchi/rales. No accessory muscle use   CV:  Regular rhythm, normal rate, no murmurs, gallops, rubs    GI:  Soft, non distended, non tender. normoactive bowel sounds, no hepatosplenomegaly     Musculoskeletal:  No edema, warm, 2+ pulses throughout    Neurologic:  Moves all extremities.   AAOx3, CN II-XII reviewed     Skin:  Good turgor, no rashes or ulcers       Data Review:    Review and/or order of clinical lab test  Review and/or order of tests in the radiology section of CPT  Review and/or order of tests in the medicine section of CPT      Labs:     Recent Labs     04/26/21 0347 04/25/21 0224   WBC 8.9 10.6   HGB 11.7* 12.1   HCT 36.6 37.0    168     Recent Labs     04/26/21 0347 04/25/21 0224 04/24/21 0435   * 135* 136   K 4.1 4.0 4.1   * 108 109*   CO2 17* 20* 19*   BUN 21* 24* 27*   CREA 0.98 1.06 1.13   * 196* 151*   CA 7.8* 8.0* 8.0*     Recent Labs     04/26/21 0347 04/25/21 0224 04/24/21  0435   ALT 45 33 26   AP 74 77 82   TBILI 0.4 0.5 0.6   TP 6.0* 6.5 6.7   ALB 2.4* 2.6* 2.8*   GLOB 3.6 3.9 3.9 Recent Labs     04/26/21  0348 04/25/21  2131 04/25/21  1917   APTT 39.2* 94.2* 97.6*      No results for input(s): FE, TIBC, PSAT, FERR in the last 72 hours. No results found for: FOL, RBCF   No results for input(s): PH, PCO2, PO2 in the last 72 hours.   Recent Labs     04/24/21  0435   TROIQ 0.08*     No results found for: CHOL, CHOLX, CHLST, CHOLV, HDL, HDLP, LDL, LDLC, DLDLP, TGLX, TRIGL, TRIGP, CHHD, CHHDX  No results found for: GLUCPOC  No results found for: COLOR, APPRN, SPGRU, REFSG, YVETTE, PROTU, GLUCU, KETU, BILU, UROU, ASYA, LEUKU, GLUKE, EPSU, BACTU, WBCU, RBCU, CASTS, UCRY      Medications Reviewed:     Current Facility-Administered Medications   Medication Dose Route Frequency    apixaban (ELIQUIS) tablet 10 mg  10 mg Oral Q12H    lisinopriL (PRINIVIL, ZESTRIL) tablet 5 mg  5 mg Oral DAILY    loperamide (IMODIUM) 1 mg/7.5 mL oral solution 2 mg  2 mg Oral QID PRN    sodium chloride (NS) flush 5-10 mL  5-10 mL IntraVENous PRN    sodium chloride (NS) flush 5-40 mL  5-40 mL IntraVENous Q8H    sodium chloride (NS) flush 5-40 mL  5-40 mL IntraVENous PRN    acetaminophen (TYLENOL) tablet 650 mg  650 mg Oral Q4H PRN    Or    acetaminophen (TYLENOL) suppository 650 mg  650 mg Rectal Q6H PRN    ondansetron (ZOFRAN) injection 4 mg  4 mg IntraVENous Q4H PRN    guaiFENesin-dextromethorphan (ROBITUSSIN DM) 100-10 mg/5 mL syrup 5 mL  5 mL Oral Q4H PRN    dexamethasone (DECADRON) 4 mg/mL injection 6 mg  6 mg IntraVENous Q24H    zinc sulfate (ZINCATE) 50 mg zinc (220 mg) capsule 1 Cap  1 Cap Oral DAILY    ascorbic acid (vitamin C) (VITAMIN C) tablet 500 mg  500 mg Oral BID     ______________________________________________________________________  EXPECTED LENGTH OF STAY: - - -  ACTUAL LENGTH OF STAY:          3                 Jonathan Vasquez MD

## 2021-04-26 NOTE — CONSULTS
Pulmonary, Critical Care, and Sleep Medicine    Initial Patient Consult    Name: Colton Bacon MRN: [id number]   : 1965 [facility]   Date: 2021        IMPRESSION:   · Acute hypoxic resp failure- due to COVID PNA and b/l PEs with RVD ( no shock)  · COVID PNA  · HTN  · Elevated inflammatory markers      RECOMMENDATIONS:   · Clinically pt doing OK. Wean O2 to sats > 92%   · Will need DOAC for at least 3 months as this was provoked by COVID  · Continue course decadron  · IS/OOB  · No need for CDT at this time  · Get ECHO for baseline PASP and RV fxn   · Pt is acutely ill     Subjective: This patient has been seen and evaluated at the request of Dr. Washington Don for SOB. Patient is a 61 y.o. male admitted with SOB and PE/COVID. On eliquis and decadron. On 2lpm O2 no WOB or resp distress. Past Medical History:   Diagnosis Date    Hypertension       Past Surgical History:   Procedure Laterality Date    HX ORTHOPAEDIC      shoulder reconstruction      Prior to Admission medications    Medication Sig Start Date End Date Taking? Authorizing Provider   lisinopriL (PRINIVIL, ZESTRIL) 20 mg tablet Take 20 mg by mouth daily. Yes Provider, Historical     No Known Allergies   Social History     Tobacco Use    Smoking status: Never Smoker    Smokeless tobacco: Never Used   Substance Use Topics    Alcohol use: Yes     Comment: social      History reviewed. No pertinent family history.      Current Facility-Administered Medications   Medication Dose Route Frequency    apixaban (ELIQUIS) tablet 10 mg  10 mg Oral Q12H    lisinopriL (PRINIVIL, ZESTRIL) tablet 5 mg  5 mg Oral DAILY    sodium chloride (NS) flush 5-40 mL  5-40 mL IntraVENous Q8H    dexamethasone (DECADRON) 4 mg/mL injection 6 mg  6 mg IntraVENous Q24H    zinc sulfate (ZINCATE) 50 mg zinc (220 mg) capsule 1 Cap  1 Cap Oral DAILY    ascorbic acid (vitamin C) (VITAMIN C) tablet 500 mg  500 mg Oral BID    cefTRIAXone (ROCEPHIN) 1 g in 0.9% sodium chloride (MBP/ADV) 50 mL MBP  1 g IntraVENous Q24H    azithromycin (ZITHROMAX) 500 mg in 0.9% sodium chloride 250 mL (VIAL-MATE)  500 mg IntraVENous Q24H       Review of Systems:  Review of systems not obtained due to patient factors. Objective:   Vital Signs:    Visit Vitals  /70 (BP 1 Location: Left upper arm, BP Patient Position: At rest)   Pulse (!) 56   Temp 97.5 °F (36.4 °C)   Resp 18   Ht 6' 1\" (1.854 m)   Wt 122.5 kg (270 lb)   SpO2 97%   BMI 35.62 kg/m²       O2 Device: Nasal cannula   O2 Flow Rate (L/min): 2 l/min   Temp (24hrs), Av.7 °F (36.5 °C), Min:97.2 °F (36.2 °C), Max:98 °F (36.7 °C)       Intake/Output:   Last shift:       07 - 1900  In: 1400 [I.V.:1400]  Out: -   Last 3 shifts: 1901 -  0700  In: 240 [P.O.:240]  Out: -     Intake/Output Summary (Last 24 hours) at 2021 1209  Last data filed at 2021 1916  Gross per 24 hour   Intake 1400 ml   Output --   Net 1400 ml      Physical Exam:   General:  Alert, cooperative, no distress, appears stated age.    NCSAT no WOB no central cyanosis no accessory use , on cell phone and 2 lpm  Data review:     Recent Results (from the past 24 hour(s))   PTT    Collection Time: 21  7:17 PM   Result Value Ref Range    aPTT 97.6 (HH) 22.1 - 31.0 sec    aPTT, therapeutic range     58.0 - 77.0 SECS   PTT    Collection Time: 21  9:31 PM   Result Value Ref Range    aPTT 94.2 (HH) 22.1 - 31.0 sec    aPTT, therapeutic range     58.0 - 77.0 SECS   CBC WITH AUTOMATED DIFF    Collection Time: 21  3:47 AM   Result Value Ref Range    WBC 8.9 4.1 - 11.1 K/uL    RBC 4.13 4.10 - 5.70 M/uL    HGB 11.7 (L) 12.1 - 17.0 g/dL    HCT 36.6 36.6 - 50.3 %    MCV 88.6 80.0 - 99.0 FL    MCH 28.3 26.0 - 34.0 PG    MCHC 32.0 30.0 - 36.5 g/dL    RDW 12.6 11.5 - 14.5 %    PLATELET 528 191 - 139 K/uL    MPV 10.0 8.9 - 12.9 FL    NRBC 0.0 0  WBC    ABSOLUTE NRBC 0.00 0.00 - 0.01 K/uL    NEUTROPHILS 88 (H) 32 - 75 %    LYMPHOCYTES 7 (L) 12 - 49 %    MONOCYTES 4 (L) 5 - 13 %    EOSINOPHILS 0 0 - 7 %    BASOPHILS 0 0 - 1 %    IMMATURE GRANULOCYTES 1 (H) 0.0 - 0.5 %    ABS. NEUTROPHILS 7.8 1.8 - 8.0 K/UL    ABS. LYMPHOCYTES 0.6 (L) 0.8 - 3.5 K/UL    ABS. MONOCYTES 0.3 0.0 - 1.0 K/UL    ABS. EOSINOPHILS 0.0 0.0 - 0.4 K/UL    ABS. BASOPHILS 0.0 0.0 - 0.1 K/UL    ABS. IMM. GRANS. 0.1 (H) 0.00 - 0.04 K/UL    DF AUTOMATED     METABOLIC PANEL, COMPREHENSIVE    Collection Time: 04/26/21  3:47 AM   Result Value Ref Range    Sodium 135 (L) 136 - 145 mmol/L    Potassium 4.1 3.5 - 5.1 mmol/L    Chloride 109 (H) 97 - 108 mmol/L    CO2 17 (L) 21 - 32 mmol/L    Anion gap 9 5 - 15 mmol/L    Glucose 137 (H) 65 - 100 mg/dL    BUN 21 (H) 6 - 20 MG/DL    Creatinine 0.98 0.70 - 1.30 MG/DL    BUN/Creatinine ratio 21 (H) 12 - 20      GFR est AA >60 >60 ml/min/1.73m2    GFR est non-AA >60 >60 ml/min/1.73m2    Calcium 7.8 (L) 8.5 - 10.1 MG/DL    Bilirubin, total 0.4 0.2 - 1.0 MG/DL    ALT (SGPT) 45 12 - 78 U/L    AST (SGOT) 24 15 - 37 U/L    Alk.  phosphatase 74 45 - 117 U/L    Protein, total 6.0 (L) 6.4 - 8.2 g/dL    Albumin 2.4 (L) 3.5 - 5.0 g/dL    Globulin 3.6 2.0 - 4.0 g/dL    A-G Ratio 0.7 (L) 1.1 - 2.2     C REACTIVE PROTEIN, QT    Collection Time: 04/26/21  3:47 AM   Result Value Ref Range    C-Reactive protein 1.93 (H) 0.00 - 0.60 mg/dL   NT-PRO BNP    Collection Time: 04/26/21  3:47 AM   Result Value Ref Range    NT pro-BNP 1,743 (H) <125 PG/ML   PTT    Collection Time: 04/26/21  3:48 AM   Result Value Ref Range    aPTT 39.2 (H) 22.1 - 31.0 sec    aPTT, therapeutic range     58.0 - 77.0 SECS       Imaging:  I have personally reviewed the patients radiographs and have reviewed the reports:  CTA chest b/l PE and RVD and diffuse GGO        Genetta Cowden, MD

## 2021-04-26 NOTE — PROGRESS NOTES
Problem: Falls - Risk of  Goal: *Absence of Falls  Description: Document Wendy Werner Fall Risk and appropriate interventions in the flowsheet.   Outcome: Progressing Towards Goal  Note: Fall Risk Interventions:

## 2021-04-27 VITALS
SYSTOLIC BLOOD PRESSURE: 130 MMHG | TEMPERATURE: 97.4 F | HEART RATE: 62 BPM | BODY MASS INDEX: 35.92 KG/M2 | HEIGHT: 73 IN | WEIGHT: 271 LBS | OXYGEN SATURATION: 94 % | DIASTOLIC BLOOD PRESSURE: 81 MMHG | RESPIRATION RATE: 20 BRPM

## 2021-04-27 LAB
ALBUMIN SERPL-MCNC: 2.6 G/DL (ref 3.5–5)
ALBUMIN/GLOB SERPL: 0.7 {RATIO} (ref 1.1–2.2)
ALP SERPL-CCNC: 77 U/L (ref 45–117)
ALT SERPL-CCNC: 48 U/L (ref 12–78)
ANION GAP SERPL CALC-SCNC: 7 MMOL/L (ref 5–15)
AST SERPL-CCNC: 17 U/L (ref 15–37)
BASOPHILS # BLD: 0 K/UL (ref 0–0.1)
BASOPHILS NFR BLD: 0 % (ref 0–1)
BILIRUB SERPL-MCNC: 0.4 MG/DL (ref 0.2–1)
BUN SERPL-MCNC: 17 MG/DL (ref 6–20)
BUN/CREAT SERPL: 18 (ref 12–20)
CALCIUM SERPL-MCNC: 8.2 MG/DL (ref 8.5–10.1)
CHLORIDE SERPL-SCNC: 109 MMOL/L (ref 97–108)
CO2 SERPL-SCNC: 21 MMOL/L (ref 21–32)
CREAT SERPL-MCNC: 0.92 MG/DL (ref 0.7–1.3)
CRP SERPL-MCNC: 1.43 MG/DL (ref 0–0.6)
DIFFERENTIAL METHOD BLD: ABNORMAL
EOSINOPHIL # BLD: 0 K/UL (ref 0–0.4)
EOSINOPHIL NFR BLD: 0 % (ref 0–7)
ERYTHROCYTE [DISTWIDTH] IN BLOOD BY AUTOMATED COUNT: 12.4 % (ref 11.5–14.5)
EST. AVERAGE GLUCOSE BLD GHB EST-MCNC: 126 MG/DL
GLOBULIN SER CALC-MCNC: 3.7 G/DL (ref 2–4)
GLUCOSE SERPL-MCNC: 150 MG/DL (ref 65–100)
HBA1C MFR BLD: 6 % (ref 4–5.6)
HCT VFR BLD AUTO: 35.7 % (ref 36.6–50.3)
HGB BLD-MCNC: 11.6 G/DL (ref 12.1–17)
IMM GRANULOCYTES # BLD AUTO: 0.1 K/UL (ref 0–0.04)
IMM GRANULOCYTES NFR BLD AUTO: 1 % (ref 0–0.5)
LYMPHOCYTES # BLD: 0.7 K/UL (ref 0.8–3.5)
LYMPHOCYTES NFR BLD: 8 % (ref 12–49)
MAGNESIUM SERPL-MCNC: 2.1 MG/DL (ref 1.6–2.4)
MCH RBC QN AUTO: 28.4 PG (ref 26–34)
MCHC RBC AUTO-ENTMCNC: 32.5 G/DL (ref 30–36.5)
MCV RBC AUTO: 87.3 FL (ref 80–99)
MONOCYTES # BLD: 0.3 K/UL (ref 0–1)
MONOCYTES NFR BLD: 4 % (ref 5–13)
NEUTS SEG # BLD: 7.6 K/UL (ref 1.8–8)
NEUTS SEG NFR BLD: 87 % (ref 32–75)
NRBC # BLD: 0 K/UL (ref 0–0.01)
NRBC BLD-RTO: 0 PER 100 WBC
PHOSPHATE SERPL-MCNC: 3.8 MG/DL (ref 2.6–4.7)
PLATELET # BLD AUTO: 186 K/UL (ref 150–400)
PMV BLD AUTO: 9.7 FL (ref 8.9–12.9)
POTASSIUM SERPL-SCNC: 4.6 MMOL/L (ref 3.5–5.1)
PROT SERPL-MCNC: 6.3 G/DL (ref 6.4–8.2)
RBC # BLD AUTO: 4.09 M/UL (ref 4.1–5.7)
RBC MORPH BLD: ABNORMAL
SODIUM SERPL-SCNC: 137 MMOL/L (ref 136–145)
WBC # BLD AUTO: 8.7 K/UL (ref 4.1–11.1)

## 2021-04-27 PROCEDURE — 86140 C-REACTIVE PROTEIN: CPT

## 2021-04-27 PROCEDURE — 74011250637 HC RX REV CODE- 250/637: Performed by: FAMILY MEDICINE

## 2021-04-27 PROCEDURE — 85025 COMPLETE CBC W/AUTO DIFF WBC: CPT

## 2021-04-27 PROCEDURE — 80053 COMPREHEN METABOLIC PANEL: CPT

## 2021-04-27 PROCEDURE — 74011250637 HC RX REV CODE- 250/637: Performed by: INTERNAL MEDICINE

## 2021-04-27 PROCEDURE — 84100 ASSAY OF PHOSPHORUS: CPT

## 2021-04-27 PROCEDURE — 83036 HEMOGLOBIN GLYCOSYLATED A1C: CPT

## 2021-04-27 PROCEDURE — 83735 ASSAY OF MAGNESIUM: CPT

## 2021-04-27 PROCEDURE — 36415 COLL VENOUS BLD VENIPUNCTURE: CPT

## 2021-04-27 RX ORDER — LOPERAMIDE HYDROCHLORIDE 2 MG/1
2 CAPSULE ORAL
Qty: 20 CAP | Refills: 0 | Status: SHIPPED | OUTPATIENT
Start: 2021-04-27 | End: 2021-05-07

## 2021-04-27 RX ORDER — ASCORBIC ACID 500 MG
500 TABLET ORAL 2 TIMES DAILY
Qty: 60 TAB | Refills: 0 | Status: SHIPPED | OUTPATIENT
Start: 2021-04-27 | End: 2021-05-27

## 2021-04-27 RX ORDER — DEXAMETHASONE 6 MG/1
6 TABLET ORAL
Qty: 6 TAB | Refills: 0 | Status: SHIPPED | OUTPATIENT
Start: 2021-04-27 | End: 2021-05-03

## 2021-04-27 RX ORDER — APIXABAN 5 MG (74)
1 KIT ORAL SEE ADMIN INSTRUCTIONS
Qty: 1 DOSE PACK | Refills: 0 | Status: SHIPPED | OUTPATIENT
Start: 2021-04-27 | End: 2021-05-27

## 2021-04-27 RX ORDER — ONDANSETRON HYDROCHLORIDE 8 MG/1
8 TABLET, FILM COATED ORAL
Qty: 10 TAB | Refills: 0 | Status: SHIPPED | OUTPATIENT
Start: 2021-04-27 | End: 2021-05-07

## 2021-04-27 RX ORDER — OXYCODONE HYDROCHLORIDE 5 MG/1
5 TABLET ORAL
Qty: 12 TAB | Refills: 0 | Status: SHIPPED | OUTPATIENT
Start: 2021-04-27 | End: 2021-04-30

## 2021-04-27 RX ORDER — ZINC SULFATE 50(220)MG
50 CAPSULE ORAL DAILY
Qty: 6 CAP | Refills: 0 | Status: SHIPPED | OUTPATIENT
Start: 2021-04-28 | End: 2021-05-04

## 2021-04-27 RX ADMIN — Medication 10 ML: at 06:43

## 2021-04-27 RX ADMIN — OXYCODONE HYDROCHLORIDE AND ACETAMINOPHEN 500 MG: 500 TABLET ORAL at 09:07

## 2021-04-27 RX ADMIN — Medication 1 CAPSULE: at 09:07

## 2021-04-27 RX ADMIN — APIXABAN 10 MG: 5 TABLET, FILM COATED ORAL at 09:07

## 2021-04-27 NOTE — DISCHARGE SUMMARY
Discharge Summary       PATIENT ID: Ronni Gavin  MRN: [id number]   YOB: 1965    DATE OF ADMISSION: 4/23/2021 10:16 AM    DATE OF DISCHARGE: 04/27/2021   PRIMARY CARE PROVIDER: Marce Lopez MD     DISCHARGING PROVIDER: Claudette Linea, MD    To contact this individual call [phone] and ask the  to page. If unavailable ask to be transferred the Adult Hospitalist Department. CONSULTATIONS: IP CONSULT TO INTERVENTIONAL RADIOLOGY  IP CONSULT TO PULMONOLOGY    PROCEDURES/SURGERIES: * No surgery found *    ADMITTING [address] [place] [address] COURSE:   Acute hypoxic respiratory failure  COVID PNA  Bilateral PE      Angie Johnson a 61 y. o. male who presents [facility] chief complaint of ongoing sob and diarrhea x 2 weeks. On admission found to have bilateral PE, CT on admit with RV strain. Admitted and required [facility] for saturations over 90%. IR was consulted, and recommended against catheter directed thrombus, given blood clots are scattered and peripheral. Patient improved on heparin gtt, although was difficult to remain in the therapeutic window. Pt was taken off O2 on 4/26, with improvement in his breathing. He has ongoing shortness of breath, but ambulated well with saturations over 93% on RA. Will DC home with PRN nausea and diarrhea medications. DISCHARGE DIAGNOSES / PLAN:      Acute hypoxic respiratory failure - secondary to PE and COVID  COVID 19 PNA   - Continue dexamethasone 6 additional days   - VitC, Zn  - Not a candidate for remdesivir symtpom onset > 10 days   - DC antibiotics as procalcitonin is negative on admission  - O2 as needed for saturation over 90%     Bilateral PE - extensive clot burden  R heart strain on CT  - DC heparin gtt   - Start eliquis, DC home with starter pack  - Echo within normal limits. No LV or RV strain noted.    - Pulmonary cleared for DC home.      Type 2 NSTEMI  Cardiogenic shock  - From PE, resolved.      HTN - DC lisinopril, BP remain wnl/low. Pt with some lightheadedness. ADDITIONAL CARE RECOMMENDATIONS:   - Please take all medications as prescribed. Note changes as below. **Add decadron for an additional 6 days. **Add vitamin C and D and Zinc and continue for about 10 days. **You do not have evidence of bacterial infection, and you do not need any antibiotics. **START Eliquis starter pack for 30 days. You will then need refill prescriptions from your PCP  - Please make sure to follow up with your primary care physician within 1-2 weeks of discharge for hospital follow up. You should have a tele-health appointment. - Please make sure to continue to monitor for: worsening chest pain, increased shortness of breath, sudden lower extremity swelling, sudden signs of a stroke, sudden onset high fevers and return to the Emergency Department with any of these symptoms.   - Please get up slowly from a seated or laying position, avoid falls. - Avoid tobacco, alcohol and other illicit drug use. - Please try and lay on your stomach as much as possible. This has been shown to assist with prevention of pneumonia and improvement in oxygen levels. - You must stay under quarantine for a minimum of 10 days from today. Your last 3 days must be symptoms free. You should discuss coming off quarantine with your physician prior to taking yourself off quarantine.  - Please buy a pulse ox, monitor closely. If you remain under 88% despite taking deep breaths, then you must come back to the hospital for evaluation.       PENDING TEST RESULTS:   At the time of discharge the following test results are still pending: None    FOLLOW UP APPOINTMENTS:    Follow-up Information     Follow up With Specialties Details Why Contact Info    Roxanna Valdez MD Family Medicine In 1 week Tele-health visit  [address] [place] [address]  [phone]               DIET: Regular Diet    ACTIVITY: Activity as tolerated    WOUND CARE: None    EQUIPMENT needed: None      DISCHARGE MEDICATIONS:  Current Discharge Medication List      START taking these medications    Details   ascorbic acid, vitamin C, (VITAMIN C) 500 mg tablet Take 1 Tab by mouth two (2) times a day for 30 days. Qty: 60 Tab, Refills: 0      dexAMETHasone (DECADRON) 6 mg tablet Take 1 Tab by mouth Daily (before breakfast) for 6 days. Qty: 6 Tab, Refills: 0      zinc sulfate (ZINCATE) 50 mg zinc (220 mg) capsule Take 1 Cap by mouth daily for 6 days. Qty: 6 Cap, Refills: 0      apixaban (Eliquis DVT-PE Treat 30D Start) 5 mg (74 tabs) starter pack Take 1 Dose Pack by mouth See Admin Instructions for 30 days. Take 10 mg (two 5 mg tablets) by mouth twice a day for 7 days   Followed by 5 mg (one 5 mg tablet) by mouth twice a day  Qty: 1 Dose Pack, Refills: 0      ondansetron hcl (Zofran) 8 mg tablet Take 1 Tab by mouth every eight (8) hours as needed for Nausea or Vomiting for up to 10 days. Qty: 10 Tab, Refills: 0      loperamide (IMODIUM) 2 mg capsule Take 1 Cap by mouth four (4) times daily as needed for Diarrhea for up to 10 days. Qty: 20 Cap, Refills: 0         STOP taking these medications       lisinopriL (PRINIVIL, ZESTRIL) 20 mg tablet Comments:   Reason for Stopping:                 NOTIFY YOUR PHYSICIAN FOR ANY OF THE FOLLOWING:   Fever over 101 degrees for 24 hours. Chest pain, shortness of breath, fever, chills, nausea, vomiting, diarrhea, change in mentation, falling, weakness, bleeding. Severe pain or pain not relieved by medications. Or, any other signs or symptoms that you may have questions about.     DISPOSITION:   X Home With:   OT  PT  HH  RN       Long term SNF/Inpatient Rehab    Independent/assisted living    Hospice    Other:       PATIENT CONDITION AT DISCHARGE:     Functional status    Poor     Deconditioned    X Independent      Cognition    X Lucid     Forgetful     Dementia      Catheters/lines (plus indication)    Colon     PICC     PEG    X None      Code status    X Full code     DNR      PHYSICAL EXAMINATION AT DISCHARGE:  Visit Vitals  /72 (BP 1 Location: Left upper arm, BP Patient Position: At rest)   Pulse (!) 57   Temp 97.6 °F (36.4 °C)   Resp 17   Ht 6' 1\" (1.854 m)   Wt 122.9 kg (271 lb)   SpO2 98%   BMI 35.75 kg/m²     Gen: NAD, awake in bed  HEENT: NC/AT, sclera anicteric, PERRL, EOMI  CV: RRR no m/r/g, normal S1 and S2, no pedal edema   Resp: CTA b/l no increased work of breathing, no wheezing or rhonchi, speaking in full sentences . Has intermittent cough.  Ambulated and walked around the room multiple times with saturations over 93% on RA  Abd: NT/ND, normal bowel sounds, no rebound or guarding  Ext: 2+ pulses, no edema  Skin: No rashes or lesions      CHRONIC MEDICAL DIAGNOSES:  Problem List as of 4/27/2021 Date Reviewed: 4/25/2021          Codes Class Noted - Resolved    HTN (hypertension) (Chronic) ICD-10-CM: I10  ICD-9-CM: 401.9  4/25/2021 - Present        Acute pulmonary embolism with acute cor pulmonale ([facility] ICD-10-CM: I26.09  ICD-9-CM: 415.19, 415.0  4/25/2021 - Present        Obesity (BMI 30-39.9) (Chronic) ICD-10-CM: E66.9  ICD-9-CM: 278.00  4/25/2021 - Present        * (Principal) Pneumonia due to COVID-19 virus ICD-10-CM: U07.1, J12.82  ICD-9-CM: 480.8, 079.89  4/23/2021 - Present        Acute pulmonary embolism ([facility] ICD-10-CM: I26.99  ICD-9-CM: 415.19  4/23/2021 - Present        COVID-19 virus infection ICD-10-CM: U07.1  ICD-9-CM: 079.89  4/23/2021 - Present              Greater than  30 minutes were spent with the patient on counseling and coordination of care    Signed:   Cardell Prader, MD  4/27/2021  10:33 AM

## 2021-04-27 NOTE — DISCHARGE INSTRUCTIONS
Dear Chuck Emerson,    Thank you for choosing [facility] for your healthcare needs. We strive to provide EXCELLENT care to you and your family. In an effort to explain clearly why you were here in the hospital, I've also written a very brief summary below. Other details including formal diagnosis, medication changes, and follow up appointment recommendations can also be found in this packet. You were admitted for shortness of breath due to bilateral blood clots in the lung, and COVID19 for which you were started on IV heparin, oxygen and closely monitored. You also received care from specialist physicians in the following specialties:  [place] TO PULMONOLOGY    Here are the updates to your medication list:  **Add decadron for an additional 6 days. **Add vitamin C and D and Zinc and continue for about 10 days. **You do not have evidence of bacterial infection, and you do not need any antibiotics. **START Eliquis starter pack for 30 days. You will then need refill prescriptions from your PCP    Remember that it is important for you to take your medications exactly as they are prescribed. It is helpful to keep a list of your medication with the names, dosages, and times to be taken in your wallet. Additionally,   - Please make sure to follow up with your primary care physician within 1-2 weeks of discharge for hospital follow up. You should have a tele-health appointment. - Please make sure to continue to monitor for: worsening chest pain, increased shortness of breath, sudden lower extremity swelling, sudden signs of a stroke, sudden onset high fevers and return to the Emergency Department with any of these symptoms.   - Please get up slowly from a seated or laying position, avoid falls. - Avoid tobacco, alcohol and other illicit drug use. - Please try and lay on your stomach as much as possible.  This has been shown to assist with prevention of pneumonia and improvement in oxygen levels. - You must stay under quarantine for a minimum of 10 days from today. Your last 3 days must be symptoms free. You should discuss coming off quarantine with your physician prior to taking yourself off quarantine.  - Please buy a pulse ox, monitor closely. If you remain under 88% despite taking deep breaths, then you must come back to the hospital for evaluation. Make sure to also see your primary care doctor for follow-up. Bring these papers with you and be sure to review your medication list with your doctor. I cannot stress the importance of follow up enough. I've included the information for your follow-up appointments below: Follow-up Information     Follow up With Specialties Details Why Contact Info    Isis Sifuentes MD Family Medicine In 1 week Tele-health visit  [address] [address]  [phone]            Should you have any fever over 101 degrees for 24 hours, chest pain, shortness of breath, fever, chills, nausea, vomiting, diarrhea, change in mentation, falling, weakness, bleeding, or worsening pain, please seek medical attention immediately. Finally, as your discharging physician, you may be receiving a survey regarding my care. I would greatly value and appreciate your input in the survey as we strive for excellence. If you have any questions, I can be reached at [phone].   Thank you so much again for allowing me to care for you at [address] [place] [address].    Respectfully yours,  Cardell Prader, MD

## 2021-04-27 NOTE — PROGRESS NOTES
RODRICK:  1. RUR-13%  2. Returning home today within the hour once OP pharmacy scripts are filled. 3. He will need [facility] transport home. Patient and MD agreeable with the above plan, cm will update wife.     Felisha Dimas, [facility]

## 2021-04-27 NOTE — PROGRESS NOTES
Problem: Falls - Risk of  Goal: *Absence of Falls  Description: Document [facility] Fall Risk and appropriate interventions in the flowsheet.   Outcome: Progressing Towards Goal  Note: Fall Risk Interventions:            Medication Interventions: Evaluate medications/consider consulting pharmacy, Patient to call before getting OOB, Teach patient to arise slowly                   Problem: Patient Education: Go to Patient Education Activity  Goal: Patient/Family Education  Outcome: Progressing Towards Goal     Problem: Discharge Planning  Goal: *Discharge to safe environment  Outcome: Progressing Towards Goal

## 2021-04-27 NOTE — PROGRESS NOTES
Attempted to schedule hospital follow up PCP appointment. Office is requesting the patient to call the office to schedule his Telehealth appointment.  Ángel Ada, Care Management Specialist.

## 2021-04-27 NOTE — PROGRESS NOTES
Bedside shift change report given to [address] [place] [address] (oncoming nurse) by Chano Cosby RN (offgoing nurse). Report included the following information SBAR, Kardex, MAR and Recent Results.

## 2021-04-27 NOTE — PROGRESS NOTES
Verbal shift change report given to Jere Blum RN  (oncoming nurse) by Grace Simms RN (offgoing nurse). Report included the following information SBAR, Kardex, Intake/Output and MAR.     1330: Patient discharged per MD in stable condition. I have reviewed discharge instructions with the patient. The patient verbalized understanding.

## 2021-04-27 NOTE — PROGRESS NOTES
PCCM:    Room air, VSS    CRP 1.43    Plan:   COVID    Discussed with hospitalist     On eliquis for PEs; 3-6 mts treatement    ECHO without RV strain    Continue decadron as prescribed     Follow up in 7 days as telemedicine visit    We will be available again to see if needed    Harjinder Garsia PA-C

## 2021-04-28 ENCOUNTER — PATIENT OUTREACH (OUTPATIENT)
Dept: CASE MANAGEMENT | Age: 56
End: 2021-04-28

## 2021-04-28 LAB
BACTERIA SPEC CULT: NORMAL
BACTERIA SPEC CULT: NORMAL
SERVICE CMNT-IMP: NORMAL
SERVICE CMNT-IMP: NORMAL

## 2021-04-28 NOTE — PROGRESS NOTES
Patient contacted regarding MUICZ-65 diagnosis\". Discussed COVID-19 related testing which was tested positive by outside facility at this time. Test results were positive. Patient informed of results, if available? no     Care Transition Nurse contacted the family by telephone to perform post discharge assessment. Call within 2 business days of discharge: Yes Verified name and  with family as identifiers. Provided introduction to self, and explanation of the CTN/ACM role, and reason for call due to risk factors for infection and/or exposure to COVID-19. Symptoms reviewed with family who verbalized the following symptoms: cough, shortness of breath, chest pain, no new symptoms and no worsening symptoms      Due to no new or worsening symptoms encounter was not routed to provider for escalation. Discussed follow-up appointments. If no appointment was previously scheduled, appointment scheduling offered:  Spouse will call PCP and pulm to schedule within 7 days   [facility] follow up appointment(s): No future appointments. Non-[facility] follow up appointment(s): pulm TBD     Advance Care Planning:   Does patient have an Advance Directive:  not on file. Patient has following risk factors of: pneumonia. CTN reviewed discharge instructions, medical action plan and red flags such as increased shortness of breath, increasing fever and signs of decompensation with family who verbalized understanding. Discussed exposure protocols and quarantine with CDC Guidelines What to do if you are sick with coronavirus disease .  Family was given an opportunity for questions and concerns. The family agrees to contact the [facility] exposure line [phone], [facility] department [address]  [phone] and PCP office for questions related to their healthcare. CTN provided contact information for future needs.     Reviewed and educated family on any new and changed medications related to discharge diagnosis Was patient discharged with a pulse oximeter? no  Spouse ordered one and CTN discussed and confirmed pulse oximeter discharge instructions and when to notify provider or seek emergency care. Spouse reports patient has medications and taking all but pain medication. No change since discharge with SOB, chest pain    Spouse educated on medication side effects and FAST.  Spouse reports house is on quarantine since all have tested COVID positive and all

## 2021-05-10 ENCOUNTER — TRANSCRIBE ORDER (OUTPATIENT)
Dept: SCHEDULING | Age: 56
End: 2021-05-10

## 2021-05-10 DIAGNOSIS — I26.99 PULMONARY EMBOLI (HCC): Primary | ICD-10-CM

## 2021-06-03 ENCOUNTER — TRANSCRIBE ORDER (OUTPATIENT)
Dept: REGISTRATION | Age: 56
End: 2021-06-03

## 2021-06-03 ENCOUNTER — HOSPITAL ENCOUNTER (OUTPATIENT)
Dept: GENERAL RADIOLOGY | Age: 56
Discharge: HOME | End: 2021-06-03
Attending: INTERNAL MEDICINE
Payer: COMMERCIAL

## 2021-06-03 ENCOUNTER — HOSPITAL ENCOUNTER (OUTPATIENT)
Dept: NON INVASIVE DIAGNOSTICS | Age: 56
Discharge: HOME | End: 2021-06-03
Attending: INTERNAL MEDICINE
Payer: COMMERCIAL

## 2021-06-03 VITALS
HEIGHT: 73 IN | WEIGHT: 271 LBS | SYSTOLIC BLOOD PRESSURE: 130 MMHG | BODY MASS INDEX: 35.92 KG/M2 | DIASTOLIC BLOOD PRESSURE: 80 MMHG

## 2021-06-03 DIAGNOSIS — I26.99 PULMONARY EMBOLI (HCC): ICD-10-CM

## 2021-06-03 DIAGNOSIS — J12.9 VIRAL PNEUMONIA: Primary | ICD-10-CM

## 2021-06-03 DIAGNOSIS — J12.9 VIRAL PNEUMONIA: ICD-10-CM

## 2021-06-03 LAB
ECHO AO ROOT DIAM: 4.09 CM
ECHO AV AREA PEAK VELOCITY: 3.34 CM2
ECHO AV AREA/BSA PEAK VELOCITY: 1.4 CM2/M2
ECHO AV PEAK GRADIENT: 6 MMHG
ECHO AV PEAK VELOCITY: 122.52 CM/S
ECHO EST RA PRESSURE: 5 MMHG
ECHO LA AREA 4C: 15.36 CM2
ECHO LA VOL 2C: 39.46 ML (ref 18–58)
ECHO LA VOL 4C: 35.13 ML (ref 18–58)
ECHO LA VOL BP: 40.02 ML (ref 18–58)
ECHO LA VOL/BSA BIPLANE: 16.33 ML/M2 (ref 16–28)
ECHO LA VOLUME INDEX A2C: 16.11 ML/M2 (ref 16–28)
ECHO LA VOLUME INDEX A4C: 14.34 ML/M2 (ref 16–28)
ECHO LV INTERNAL DIMENSION DIASTOLIC: 5.11 CM (ref 4.2–5.9)
ECHO LV INTERNAL DIMENSION DIASTOLIC: 5.21 CM (ref 4.2–5.9)
ECHO LV INTERNAL DIMENSION SYSTOLIC: 3.61 CM
ECHO LV IVSD: 1.11 CM (ref 0.6–1)
ECHO LV POSTERIOR WALL DIASTOLIC: 1.63 CM (ref 0.6–1)
ECHO LVOT DIAM: 2.43 CM
ECHO LVOT PEAK GRADIENT: 3.14 MMHG
ECHO LVOT PEAK VELOCITY: 88.65 CM/S
ECHO MV A VELOCITY: 65.18 CM/S
ECHO MV E DECELERATION TIME (DT): 293.41 MS
ECHO MV E VELOCITY: 50.13 CM/S
ECHO MV E/A RATIO: 0.77
ECHO PV PEAK INSTANTANEOUS GRADIENT SYSTOLIC: 3.2 MMHG
ECHO RIGHT VENTRICULAR SYSTOLIC PRESSURE (RVSP): 26.39 MMHG
ECHO RV TAPSE: 2.56 CM (ref 1.5–2)
ECHO TV REGURGITANT MAX VELOCITY: 231.23 CM/S
ECHO TV REGURGITANT PEAK GRADIENT: 21.39 MMHG
LA VOL DISK BP: 38.08 ML (ref 18–58)

## 2021-06-03 PROCEDURE — 93306 TTE W/DOPPLER COMPLETE: CPT

## 2021-06-03 PROCEDURE — 93306 TTE W/DOPPLER COMPLETE: CPT | Performed by: SPECIALIST

## 2021-06-03 PROCEDURE — 71046 X-RAY EXAM CHEST 2 VIEWS: CPT

## 2021-06-14 ENCOUNTER — TRANSCRIBE ORDER (OUTPATIENT)
Dept: SCHEDULING | Age: 56
End: 2021-06-14

## 2021-06-14 DIAGNOSIS — I26.99 PULMONARY EMBOLISM (HCC): Primary | ICD-10-CM

## 2021-07-29 ENCOUNTER — HOSPITAL ENCOUNTER (OUTPATIENT)
Dept: CT IMAGING | Age: 56
Discharge: HOME | End: 2021-07-29
Attending: INTERNAL MEDICINE
Payer: COMMERCIAL

## 2021-07-29 DIAGNOSIS — I26.99 PULMONARY EMBOLISM (HCC): ICD-10-CM

## 2021-07-29 PROCEDURE — 71275 CT ANGIOGRAPHY CHEST: CPT

## 2021-07-29 PROCEDURE — 74011000636 HC RX REV CODE- 636: Performed by: INTERNAL MEDICINE

## 2021-07-29 RX ADMIN — IOPAMIDOL 80 ML: 755 INJECTION, SOLUTION INTRAVENOUS at 19:16

## 2021-11-28 ENCOUNTER — HOSPITAL ENCOUNTER (EMERGENCY)
Age: 56
Discharge: HOME | End: 2021-11-28
Attending: STUDENT IN AN ORGANIZED HEALTH CARE EDUCATION/TRAINING PROGRAM
Payer: COMMERCIAL

## 2021-11-28 ENCOUNTER — APPOINTMENT (OUTPATIENT)
Dept: CT IMAGING | Age: 56
End: 2021-11-28
Attending: STUDENT IN AN ORGANIZED HEALTH CARE EDUCATION/TRAINING PROGRAM
Payer: COMMERCIAL

## 2021-11-28 ENCOUNTER — APPOINTMENT (OUTPATIENT)
Dept: GENERAL RADIOLOGY | Age: 56
End: 2021-11-28
Attending: STUDENT IN AN ORGANIZED HEALTH CARE EDUCATION/TRAINING PROGRAM
Payer: COMMERCIAL

## 2021-11-28 VITALS
DIASTOLIC BLOOD PRESSURE: 104 MMHG | TEMPERATURE: 98.2 F | OXYGEN SATURATION: 97 % | RESPIRATION RATE: 18 BRPM | SYSTOLIC BLOOD PRESSURE: 170 MMHG | HEART RATE: 82 BPM

## 2021-11-28 DIAGNOSIS — I26.99 BILATERAL PULMONARY EMBOLISM (HCC): Primary | ICD-10-CM

## 2021-11-28 LAB
ANION GAP SERPL CALC-SCNC: 10 MMOL/L (ref 5–15)
ATRIAL RATE: 84 BPM
BASOPHILS # BLD: 0.1 K/UL (ref 0–0.1)
BASOPHILS NFR BLD: 1 % (ref 0–1)
BUN SERPL-MCNC: 14 MG/DL (ref 6–20)
BUN/CREAT SERPL: 11 (ref 12–20)
CALCIUM SERPL-MCNC: 8.3 MG/DL (ref 8.5–10.1)
CALCULATED P AXIS, ECG09: 18 DEGREES
CALCULATED R AXIS, ECG10: 26 DEGREES
CALCULATED T AXIS, ECG11: 30 DEGREES
CHLORIDE SERPL-SCNC: 104 MMOL/L (ref 97–108)
CO2 SERPL-SCNC: 25 MMOL/L (ref 21–32)
COMMENT, HOLDF: NORMAL
CREAT SERPL-MCNC: 1.23 MG/DL (ref 0.7–1.3)
D DIMER PPP FEU-MCNC: 5.24 MG/L FEU (ref 0–0.65)
DIAGNOSIS, 93000: NORMAL
DIFFERENTIAL METHOD BLD: ABNORMAL
EOSINOPHIL # BLD: 0.3 K/UL (ref 0–0.4)
EOSINOPHIL NFR BLD: 3 % (ref 0–7)
ERYTHROCYTE [DISTWIDTH] IN BLOOD BY AUTOMATED COUNT: 13.9 % (ref 11.5–14.5)
GLUCOSE SERPL-MCNC: 90 MG/DL (ref 65–100)
HCT VFR BLD AUTO: 40.3 % (ref 36.6–50.3)
HGB BLD-MCNC: 12.2 G/DL (ref 12.1–17)
IMM GRANULOCYTES # BLD AUTO: 0 K/UL (ref 0–0.04)
IMM GRANULOCYTES NFR BLD AUTO: 0 % (ref 0–0.5)
LYMPHOCYTES # BLD: 1.6 K/UL (ref 0.8–3.5)
LYMPHOCYTES NFR BLD: 16 % (ref 12–49)
MCH RBC QN AUTO: 23.9 PG (ref 26–34)
MCHC RBC AUTO-ENTMCNC: 30.3 G/DL (ref 30–36.5)
MCV RBC AUTO: 79 FL (ref 80–99)
MONOCYTES # BLD: 0.8 K/UL (ref 0–1)
MONOCYTES NFR BLD: 8 % (ref 5–13)
NEUTS SEG # BLD: 7.1 K/UL (ref 1.8–8)
NEUTS SEG NFR BLD: 72 % (ref 32–75)
NRBC # BLD: 0 K/UL (ref 0–0.01)
NRBC BLD-RTO: 0 PER 100 WBC
P-R INTERVAL, ECG05: 174 MS
PLATELET # BLD AUTO: 255 K/UL (ref 150–400)
PMV BLD AUTO: 10.3 FL (ref 8.9–12.9)
POTASSIUM SERPL-SCNC: 4 MMOL/L (ref 3.5–5.1)
Q-T INTERVAL, ECG07: 374 MS
QRS DURATION, ECG06: 76 MS
QTC CALCULATION (BEZET), ECG08: 441 MS
RBC # BLD AUTO: 5.1 M/UL (ref 4.1–5.7)
SAMPLES BEING HELD,HOLD: NORMAL
SODIUM SERPL-SCNC: 139 MMOL/L (ref 136–145)
VENTRICULAR RATE, ECG03: 84 BPM
WBC # BLD AUTO: 9.9 K/UL (ref 4.1–11.1)

## 2021-11-28 PROCEDURE — 71046 X-RAY EXAM CHEST 2 VIEWS: CPT

## 2021-11-28 PROCEDURE — 93005 ELECTROCARDIOGRAM TRACING: CPT

## 2021-11-28 PROCEDURE — 85025 COMPLETE CBC W/AUTO DIFF WBC: CPT

## 2021-11-28 PROCEDURE — 80048 BASIC METABOLIC PNL TOTAL CA: CPT

## 2021-11-28 PROCEDURE — 85379 FIBRIN DEGRADATION QUANT: CPT

## 2021-11-28 PROCEDURE — 99283 EMERGENCY DEPT VISIT LOW MDM: CPT

## 2021-11-28 PROCEDURE — 71275 CT ANGIOGRAPHY CHEST: CPT

## 2021-11-28 PROCEDURE — 74011000636 HC RX REV CODE- 636: Performed by: STUDENT IN AN ORGANIZED HEALTH CARE EDUCATION/TRAINING PROGRAM

## 2021-11-28 RX ORDER — FLUTICASONE FUROATE AND VILANTEROL TRIFENATATE 200; 25 UG/1; UG/1
1 POWDER RESPIRATORY (INHALATION) DAILY
COMMUNITY

## 2021-11-28 RX ORDER — ALBUTEROL SULFATE 0.83 MG/ML
1.25 SOLUTION RESPIRATORY (INHALATION)
COMMUNITY

## 2021-11-28 RX ADMIN — IOPAMIDOL 100 ML: 755 INJECTION, SOLUTION INTRAVENOUS at 14:08

## 2021-11-28 NOTE — ED TRIAGE NOTES
Triage Note: Patient arrives to ER complaining of increased shortness of breath Tuesday. Patient had COVID in April and had PEs. Pulmonolgy stopped his Eliquis on 11/2/21. States his shortness of breath has increased with exertion. Denies chest pain.

## 2021-11-28 NOTE — DISCHARGE INSTRUCTIONS
You presented to the ED with worsening shortness of breath. You had bilateral pulmonary Merlyn's associated Covid pneumonia in April. He stopped Eliquis approximately 3 weeks ago. Your CTA shows bilateral pulmonary embolisms again after being clear on your wrist recent scan. Your PESI score which is indication of mortality and risk from pulmonary embolisms is low. You have no hypoxia at rest, no tachycardia no chest pain. You do have some mild hypoxia with exertion. Prescriptions were written for Eliquis. Take 10 mg twice a day for the first 7 days and then 5 mg twice a day until stop by your pulmonary team.  Please follow-up with your pulmonologist on Monday given a call your current status and what happened in the ED. If you continue have shortness of breath after initial treatment you may need an oxygen prescription for walking and exertion.

## 2021-11-29 NOTE — ED PROVIDER NOTES
Patient is a 50-year-old male who had Covid in the spring was diagnosed with bilateral pulmonary embolisms in April started on Eliquis. Patient is followed closely by pulmonology, Eliquis stopped approximately 3 weeks ago. Patient's shortness of breath acutely returned over the weekend. Patient denies any infectious symptoms. Patient has shortness of breath exertion but nothing at rest.  Denies any chest pain. The history is provided by the patient, the spouse and medical records. Shortness of Breath  This is a new problem. The problem occurs continuously. The current episode started more than 2 days ago. The problem has been gradually worsening. Pertinent negatives include no fever, no headaches, no rhinorrhea, no sore throat, no cough, no sputum production, no orthopnea, no chest pain, no syncope, no abdominal pain, no leg pain and no leg swelling. Precipitated by: Stopping Eliquis. Risk factors: Hypercoagulable state after Covid pneumonia, recent pulmonary embolisms. He has tried ipratropium inhalers for the symptoms. The treatment provided no relief. He has had prior hospitalizations. He has had prior ED visits. Associated medical issues include PE. Past Medical History:   Diagnosis Date    Hypertension        Past Surgical History:   Procedure Laterality Date    HX ORTHOPAEDIC      shoulder reconstruction         No family history on file.     Social History     Socioeconomic History    Marital status:      Spouse name: Not on file    Number of children: Not on file    Years of education: Not on file    Highest education level: Not on file   Occupational History    Not on file   Tobacco Use    Smoking status: Never Smoker    Smokeless tobacco: Never Used   Substance and Sexual Activity    Alcohol use: Yes     Comment: social    Drug use: Never    Sexual activity: Not on file   Other Topics Concern    Not on file   Social History Narrative    Not on file     Social Determinants of Health     Financial Resource Strain:     Difficulty of Paying Living Expenses: Not on file   Food Insecurity:     Worried About Running Out of Food in the Last Year: Not on file    Leigh of Food in the Last Year: Not on file   Transportation Needs:     Lack of Transportation (Medical): Not on file    Lack of Transportation (Non-Medical): Not on file   Physical Activity:     Days of Exercise per Week: Not on file    Minutes of Exercise per Session: Not on file   Stress:     Feeling of Stress : Not on file   Social Connections:     Frequency of Communication with Friends and Family: Not on file    Frequency of Social Gatherings with Friends and Family: Not on file    Attends Shinto Services: Not on file    Active Member of [address] [place] [address] [employer] or Organizations: Not on file    Attends Club or Organization Meetings: Not on file    Marital Status: Not on file   Intimate Partner Violence:     Fear of Current or Ex-Partner: Not on file    Emotionally Abused: Not on file    Physically Abused: Not on file    Sexually Abused: Not on file   Housing Stability:     Unable to Pay for Housing in the Last Year: Not on file    Number of [place] in the Last Year: Not on file    Unstable Housing in the Last Year: Not on file         ALLERGIES: Patient has no known allergies. Review of Systems   Constitutional: Negative. Negative for fever. HENT: Negative. Negative for rhinorrhea and sore throat. Eyes: Negative. Respiratory: Positive for shortness of breath. Negative for cough and sputum production. Cardiovascular: Negative. Negative for chest pain, orthopnea, leg swelling and syncope. Gastrointestinal: Negative. Negative for abdominal pain. Endocrine: Negative. Genitourinary: Negative. Musculoskeletal: Negative. Skin: Negative. Allergic/Immunologic: Negative. Neurological: Negative. Negative for headaches. Hematological: Negative. Psychiatric/Behavioral: Negative. Vitals:    11/28/21 1233 11/28/21 1510   BP: (!) 170/104    Pulse: 82    Resp: 18    Temp: 98.2 °F (36.8 °C)    SpO2: 96% 97%            Physical Exam  Vitals and nursing note reviewed. Constitutional:       General: He is not in acute distress. Appearance: Normal appearance. HENT:      Head: Normocephalic and atraumatic. Right Ear: External ear normal.      Left Ear: External ear normal.      Nose: Nose normal.   Eyes:      Extraocular Movements: Extraocular movements intact. Conjunctiva/sclera: Conjunctivae normal.   Cardiovascular:      Rate and Rhythm: Normal rate. Pulses: Normal pulses. Radial pulses are 2+ on the right side and 2+ on the left side. Heart sounds: Normal heart sounds. Pulmonary:      Effort: Pulmonary effort is normal.      Breath sounds: Normal breath sounds. Chest:      Chest wall: No deformity or tenderness. Abdominal:      General: Abdomen is flat. There is no distension. Tenderness: There is no abdominal tenderness. Musculoskeletal:         General: No deformity or signs of injury. Normal range of motion. Cervical back: Normal range of motion and neck supple. No tenderness. Skin:     General: Skin is warm and dry. Capillary Refill: Capillary refill takes less than 2 seconds. Neurological:      General: No focal deficit present. Mental Status: He is alert and oriented to person, place, and time. Psychiatric:         Attention and Perception: Attention normal.         Mood and Affect: Mood normal.         Behavior: Behavior normal.          [facility]  ED Course as of 11/28/21 2146   Sun Nov 28, 2021   1325 EKG interpretation:   Rhythm: normal sinus rhythm; and regular . Rate (approx.): 84; Axis: normal; Intervals: normal ; ST/T wave: normal; EKG documented and interpreted by Nya Dunaway.  Monique Interiano MD, Emergency Medicine.     [AL]   9114 D-dimer(!): 5.24 [AL]      ED Course User Index  [AL] Duong Appiah MD     LABORATORY RESULTS:  Labs Reviewed   D DIMER - Abnormal; Notable for the following components:       Result Value    D-dimer 5.24 (*)     All other components within normal limits   METABOLIC PANEL, BASIC - Abnormal; Notable for the following components:    BUN/Creatinine ratio 11 (*)     Calcium 8.3 (*)     All other components within normal limits   CBC WITH AUTOMATED DIFF - Abnormal; Notable for the following components:    MCV 79.0 (*)     MCH 23.9 (*)     All other components within normal limits   SAMPLES BEING HELD       IMAGING RESULTS:  CTA CHEST W OR W WO CONT   Final Result   1. Multilobar bilateral PE, within lobar and segmental branches. 2.  Right lower lobe consolidation. The findings were called to Dr. Alyse Londono on 11/28/2021 at 2:38 PM by Dr. Kylie Giraldo. 789            XR CHEST PA LAT   Final Result   Mild right middle lobe airspace disease. MEDICATIONS GIVEN:  Medications   iopamidoL (ISOVUE-370) 76 % injection 100 mL (100 mL IntraVENous Given 11/28/21 1408)       Differential diagnosis: Atypical pneumonia, pneumonia, pulmonary embolism, pneumothorax    ED physician interpretation of EKG: No STEMI. See my interpretation EKG and ED course above. ED physician interpretation of laboratory results: Significant elevated D-dimer concerning for PE. Patient's other blood work unremarkable. MDM: Patient is a 25-year-old male presenting to the ED with shortness of breath approximately 3 weeks after stopping Eliquis for PEs from Covid pneumonia found to have bilateral pulmonary embolisms with a low risk PESI score responded well to Eliquis in the past appropriate for discharge home on oral anticoagulation and close follow-up with his pulmonologist.    Patient vital signs are stable, patient afebrile. Patient had some mild hypoxia with walking in the low 90s. Once at rest patient got admitted 90s. Patient has no other concerning symptoms or features.   Likely patient still hypercoagulable for some reason from his Covid pneumonia. Patient and family comfortable with discharge home on oral anticoagulation and close follow-up. No further emergency medical intervention needed at this time. Patient given strict return precautions for symptomatic pulmonary embolisms. Patient given instructions for Eliquis loading dose of 10 mg twice daily for the first 7 days followed by 5 mg twice daily. Key discharge instructions and summary of care: You presented to the ED with worsening shortness of breath. You had bilateral pulmonary embolisms associated Covid pneumonia in April. He stopped Eliquis approximately 3 weeks ago. Your CTA shows bilateral pulmonary embolisms again after being clear on your wrist recent scan. Your PESI score which is indication of risk from pulmonary embolisms is low. You have no hypoxia at rest, no tachycardia no chest pain. You do have some mild hypoxia with exertion. Prescriptions were written for Eliquis. Take 10 mg twice a day for the first 7 days and then 5 mg twice a day until stop by your pulmonary team.  Please follow-up with your pulmonologist on Monday given a call your current status and what happened in the ED. If you continue have shortness of breath after initial treatment you may need an oxygen prescription for walking and exertion. The patient has been re-evaluated and feeling better. Patient is stable for discharge. All available radiology and laboratory results have been reviewed with patient and/or available family. Patient and/or family verbally conveyed their understanding and agreement of the patient's signs, symptoms, diagnosis, treatment and prognosis and additionally agree to follow-up as recommended in the discharge instructions or to return to the Emergency Department should their condition change or worsen prior to their follow-up appointment. All questions have been answered and patient and/or available family express understanding. IMPRESSION:  1. Bilateral pulmonary embolism (HCC)        DISPOSITION: Discharged    Campos Mo MD        Procedures

## 2022-03-18 PROBLEM — I26.99 ACUTE PULMONARY EMBOLISM (HCC): Status: ACTIVE | Noted: 2021-04-23

## 2022-03-18 PROBLEM — I26.09 ACUTE PULMONARY EMBOLISM WITH ACUTE COR PULMONALE (HCC): Status: ACTIVE | Noted: 2021-04-25

## 2022-03-18 PROBLEM — U07.1 PNEUMONIA DUE TO COVID-19 VIRUS: Status: ACTIVE | Noted: 2021-04-23

## 2022-03-18 PROBLEM — J12.82 PNEUMONIA DUE TO COVID-19 VIRUS: Status: ACTIVE | Noted: 2021-04-23

## 2022-03-18 PROBLEM — U07.1 COVID-19 VIRUS INFECTION: Status: ACTIVE | Noted: 2021-04-23

## 2022-03-18 PROBLEM — I10 HTN (HYPERTENSION): Status: ACTIVE | Noted: 2021-04-25

## 2022-03-19 PROBLEM — E66.9 OBESITY (BMI 30-39.9): Status: ACTIVE | Noted: 2021-04-25

## 2022-05-15 ENCOUNTER — APPOINTMENT (OUTPATIENT)
Dept: GENERAL RADIOLOGY | Age: 57
End: 2022-05-15
Attending: STUDENT IN AN ORGANIZED HEALTH CARE EDUCATION/TRAINING PROGRAM
Payer: COMMERCIAL

## 2022-05-15 ENCOUNTER — APPOINTMENT (OUTPATIENT)
Dept: CT IMAGING | Age: 57
End: 2022-05-15
Attending: STUDENT IN AN ORGANIZED HEALTH CARE EDUCATION/TRAINING PROGRAM
Payer: COMMERCIAL

## 2022-05-15 ENCOUNTER — HOSPITAL ENCOUNTER (EMERGENCY)
Age: 57
Discharge: HOME | End: 2022-05-15
Attending: STUDENT IN AN ORGANIZED HEALTH CARE EDUCATION/TRAINING PROGRAM | Admitting: STUDENT IN AN ORGANIZED HEALTH CARE EDUCATION/TRAINING PROGRAM
Payer: COMMERCIAL

## 2022-05-15 VITALS
WEIGHT: 285.06 LBS | HEART RATE: 77 BPM | RESPIRATION RATE: 24 BRPM | OXYGEN SATURATION: 96 % | SYSTOLIC BLOOD PRESSURE: 149 MMHG | HEIGHT: 73 IN | TEMPERATURE: 98.6 F | DIASTOLIC BLOOD PRESSURE: 88 MMHG | BODY MASS INDEX: 37.78 KG/M2

## 2022-05-15 DIAGNOSIS — D64.9 ANEMIA, UNSPECIFIED TYPE: ICD-10-CM

## 2022-05-15 DIAGNOSIS — S01.01XA LACERATION OF SCALP, INITIAL ENCOUNTER: ICD-10-CM

## 2022-05-15 DIAGNOSIS — R55 SYNCOPE AND COLLAPSE: Primary | ICD-10-CM

## 2022-05-15 LAB
ALBUMIN SERPL-MCNC: 3.4 G/DL (ref 3.5–5)
ALBUMIN/GLOB SERPL: 1 {RATIO} (ref 1.1–2.2)
ALP SERPL-CCNC: 81 U/L (ref 45–117)
ALT SERPL-CCNC: 23 U/L (ref 12–78)
ANION GAP SERPL CALC-SCNC: 10 MMOL/L (ref 5–15)
AST SERPL-CCNC: 11 U/L (ref 15–37)
ATRIAL RATE: 75 BPM
BASOPHILS # BLD: 0.1 K/UL (ref 0–0.1)
BASOPHILS NFR BLD: 1 % (ref 0–1)
BILIRUB SERPL-MCNC: 0.4 MG/DL (ref 0.2–1)
BUN SERPL-MCNC: 14 MG/DL (ref 6–20)
BUN/CREAT SERPL: 12 (ref 12–20)
CALCIUM SERPL-MCNC: 8.5 MG/DL (ref 8.5–10.1)
CALCULATED P AXIS, ECG09: 21 DEGREES
CALCULATED R AXIS, ECG10: 18 DEGREES
CALCULATED T AXIS, ECG11: 21 DEGREES
CHLORIDE SERPL-SCNC: 105 MMOL/L (ref 97–108)
CO2 SERPL-SCNC: 27 MMOL/L (ref 21–32)
CREAT SERPL-MCNC: 1.17 MG/DL (ref 0.7–1.3)
DIAGNOSIS, 93000: NORMAL
DIFFERENTIAL METHOD BLD: ABNORMAL
EOSINOPHIL # BLD: 0.3 K/UL (ref 0–0.4)
EOSINOPHIL NFR BLD: 4 % (ref 0–7)
ERYTHROCYTE [DISTWIDTH] IN BLOOD BY AUTOMATED COUNT: 15.7 % (ref 11.5–14.5)
GLOBULIN SER CALC-MCNC: 3.3 G/DL (ref 2–4)
GLUCOSE SERPL-MCNC: 132 MG/DL (ref 65–100)
HCT VFR BLD AUTO: 30.7 % (ref 36.6–50.3)
HEMOCCULT STL QL: NEGATIVE
HGB BLD-MCNC: 8.6 G/DL (ref 12.1–17)
IMM GRANULOCYTES # BLD AUTO: 0 K/UL (ref 0–0.04)
IMM GRANULOCYTES NFR BLD AUTO: 0 % (ref 0–0.5)
LYMPHOCYTES # BLD: 1 K/UL (ref 0.8–3.5)
LYMPHOCYTES NFR BLD: 14 % (ref 12–49)
MCH RBC QN AUTO: 19.9 PG (ref 26–34)
MCHC RBC AUTO-ENTMCNC: 28 G/DL (ref 30–36.5)
MCV RBC AUTO: 71.1 FL (ref 80–99)
MONOCYTES # BLD: 0.7 K/UL (ref 0–1)
MONOCYTES NFR BLD: 10 % (ref 5–13)
NEUTS SEG # BLD: 5.2 K/UL (ref 1.8–8)
NEUTS SEG NFR BLD: 71 % (ref 32–75)
NRBC # BLD: 0 K/UL (ref 0–0.01)
NRBC BLD-RTO: 0 PER 100 WBC
P-R INTERVAL, ECG05: 178 MS
PLATELET # BLD AUTO: 324 K/UL (ref 150–400)
PMV BLD AUTO: 9 FL (ref 8.9–12.9)
POTASSIUM SERPL-SCNC: 3.6 MMOL/L (ref 3.5–5.1)
PROT SERPL-MCNC: 6.7 G/DL (ref 6.4–8.2)
Q-T INTERVAL, ECG07: 366 MS
QRS DURATION, ECG06: 86 MS
QTC CALCULATION (BEZET), ECG08: 408 MS
RBC # BLD AUTO: 4.32 M/UL (ref 4.1–5.7)
RBC MORPH BLD: ABNORMAL
SODIUM SERPL-SCNC: 142 MMOL/L (ref 136–145)
TROPONIN-HIGH SENSITIVITY: 17 NG/L (ref 0–76)
VENTRICULAR RATE, ECG03: 75 BPM
WBC # BLD AUTO: 7.3 K/UL (ref 4.1–11.1)

## 2022-05-15 PROCEDURE — 80053 COMPREHEN METABOLIC PANEL: CPT

## 2022-05-15 PROCEDURE — 93005 ELECTROCARDIOGRAM TRACING: CPT

## 2022-05-15 PROCEDURE — 70450 CT HEAD/BRAIN W/O DYE: CPT

## 2022-05-15 PROCEDURE — 74011250636 HC RX REV CODE- 250/636: Performed by: STUDENT IN AN ORGANIZED HEALTH CARE EDUCATION/TRAINING PROGRAM

## 2022-05-15 PROCEDURE — 73110 X-RAY EXAM OF WRIST: CPT

## 2022-05-15 PROCEDURE — 36415 COLL VENOUS BLD VENIPUNCTURE: CPT

## 2022-05-15 PROCEDURE — 99285 EMERGENCY DEPT VISIT HI MDM: CPT

## 2022-05-15 PROCEDURE — 84484 ASSAY OF TROPONIN QUANT: CPT

## 2022-05-15 PROCEDURE — 75810000293 HC SIMP/SUPERF WND  RPR

## 2022-05-15 PROCEDURE — 85025 COMPLETE CBC W/AUTO DIFF WBC: CPT

## 2022-05-15 PROCEDURE — 90715 TDAP VACCINE 7 YRS/> IM: CPT | Performed by: STUDENT IN AN ORGANIZED HEALTH CARE EDUCATION/TRAINING PROGRAM

## 2022-05-15 PROCEDURE — 82272 OCCULT BLD FECES 1-3 TESTS: CPT

## 2022-05-15 PROCEDURE — 74011000250 HC RX REV CODE- 250: Performed by: STUDENT IN AN ORGANIZED HEALTH CARE EDUCATION/TRAINING PROGRAM

## 2022-05-15 PROCEDURE — 90471 IMMUNIZATION ADMIN: CPT

## 2022-05-15 RX ADMIN — Medication 5 ML: at 05:12

## 2022-05-15 RX ADMIN — TETANUS TOXOID, REDUCED DIPHTHERIA TOXOID AND ACELLULAR PERTUSSIS VACCINE, ADSORBED 0.5 ML: 5; 2.5; 8; 8; 2.5 SUSPENSION INTRAMUSCULAR at 05:40

## 2022-05-15 NOTE — DISCHARGE INSTRUCTIONS
PLEASE KEEP A CLOSE EYE ON YOUR WOUND(S). IF YOU NOTICE RED STREAKING, INCREASING DRAINAGE, WORSENING REDNESS, OR FEVER THEN PLEASE RETURN IMMEDIATELY.      Staples can come out in 10 days  Keep dry for 24 hours  No soaking the scalp until staples are out

## 2022-05-15 NOTE — ED TRIAGE NOTES
Pt had a coughing fit around 2am and had a syncopal episode where he fell and hit his head on something. Pt on eliquis. Small laceration to top of head.

## 2022-05-15 NOTE — ED PROVIDER NOTES
Patient is a 79-year-old male history of PE on Eliquis presenting today secondary to head injury with syncope. Around 2 AM this morning patient says he had a coughing fit in bed. The next day he noticed he was on the floor and had head wound. His wife says that there is a significant amount of blood everywhere but it stopped with application of pressure. He has some pain of the right wrist.  Patient reports weakness and cough recently but otherwise feeling fine currently. No chest pain or shortness of breath. No palpitations. No focal weakness or numbness. No headache or neck pain. He is he has had a similar event occur in the past with coughing. Past Medical History:   Diagnosis Date    Hypertension        Past Surgical History:   Procedure Laterality Date    HX ORTHOPAEDIC      shoulder reconstruction         No family history on file. Social History     Socioeconomic History    Marital status:      Spouse name: Not on file    Number of children: Not on file    Years of education: Not on file    Highest education level: Not on file   Occupational History    Not on file   Tobacco Use    Smoking status: Never Smoker    Smokeless tobacco: Never Used   Substance and Sexual Activity    Alcohol use: Yes     Comment: social    Drug use: Never    Sexual activity: Not on file   Other Topics Concern    Not on file   Social History Narrative    Not on file     Social Determinants of Health     Financial Resource Strain:     Difficulty of Paying Living Expenses: Not on file   Food Insecurity:     Worried About [address] [employer] in the Last Year: Not on file    Ran Out of Food in the Last Year: Not on file   Transportation Needs:     Lack of Transportation (Medical): Not on file    Lack of Transportation (Non-Medical):  Not on file   Physical Activity:     Days of Exercise per Week: Not on file    Minutes of Exercise per Session: Not on file   Stress:     Feeling of Stress : Not on file   Social Connections:     Frequency of Communication with Friends and Family: Not on file    Frequency of Social Gatherings with Friends and Family: Not on file    Attends Jain Services: Not on file    Active Member of Clubs or Organizations: Not on file    Attends Club or Organization Meetings: Not on file    Marital Status: Not on file   Intimate Partner Violence:     Fear of Current or Ex-Partner: Not on file    Emotionally Abused: Not on file    Physically Abused: Not on file    Sexually Abused: Not on file   Housing Stability:     Unable to Pay for Housing in the Last Year: Not on file    Number of [place] in the Last Year: Not on file    Unstable Housing in the Last Year: Not on file         ALLERGIES: Patient has no known allergies. Review of Systems   Constitutional: Positive for fatigue. Negative for chills and fever. HENT: Negative for congestion and sore throat. Eyes: Negative for pain and redness. Respiratory: Positive for cough. Negative for shortness of breath. Cardiovascular: Negative for chest pain and palpitations. Gastrointestinal: Negative for abdominal pain, diarrhea, nausea and vomiting. Genitourinary: Negative for frequency and hematuria. Musculoskeletal: Positive for arthralgias. Negative for back pain and neck pain. Skin: Positive for wound. Negative for rash. Neurological: Positive for syncope. Negative for dizziness and headaches. Hematological: Bruises/bleeds easily. Vitals:    05/15/22 0327   BP: (!) 154/89   Pulse: 77   Resp: 16   Temp: 98.6 °F (37 °C)   SpO2: 97%   Weight: 129.3 kg (285 lb 0.9 oz)   Height: 6' 1\" (1.854 m)            Physical Exam  Vitals and nursing note reviewed. Constitutional:       General: He is not in acute distress. Appearance: He is well-developed. HENT:      Head: Normocephalic.       Comments: 1.5 cm laceration to the top of his scalp  Eyes:      Conjunctiva/sclera: Conjunctivae normal.      Pupils: Pupils are equal, round, and reactive to light. Neck:      Comments: No C-spine tenderness  Cardiovascular:      Rate and Rhythm: Normal rate and regular rhythm. Heart sounds: Normal heart sounds. No murmur heard. No friction rub. No gallop. Comments: Equal radial, dp, and pt pulses  Pulmonary:      Effort: Pulmonary effort is normal. No respiratory distress. Breath sounds: Normal breath sounds. No wheezing or rales. Abdominal:      General: Bowel sounds are normal. There is no distension. Palpations: Abdomen is soft. Tenderness: There is no abdominal tenderness. There is no guarding or rebound. Musculoskeletal:         General: Normal range of motion. Cervical back: Normal range of motion and neck supple. Comments: Tenderness to the distal radial and ulnar aspects of the wrist without deformity on the right. No snuffbox tenderness or pain with axial loading of the thumb. Palpable radial pulse. Skin:     General: Skin is warm and dry. Capillary Refill: Capillary refill takes less than 2 seconds. Findings: No rash. Neurological:      Mental Status: He is alert and oriented to person, place, and time. Cranial Nerves: No cranial nerve deficit. Sensory: No sensory deficit. Motor: No weakness. Gait: Gait normal.   Psychiatric:         Behavior: Behavior normal.          MDM     Amount and/or Complexity of Data Reviewed  Clinical lab tests: reviewed  Decide to obtain previous medical records or to obtain history from someone other than the patient: yes           Procedures    Labs:   Anemic  No leukocytosis  Renal function electrolytes acceptable  Troponin okay    Hemoccult negative    CT head negative  X-ray of the right wrist negative    5:49 AM  Procedure Note - LACERATION STAPLES:    Wound Location:scalp  Wound Size: 1.5cm  Debridement: No  Nerve Involvement: No  Tendon Involvement: No  Foreign Bodies Found:  None    Wound edges anesthetized with LET  Wound irrigated using 200cc of saline under high pressure. Wound explored for foreign bodies and none found. Wound edges reapproximated and closed using staples. Number of staples: 3    Procedure was well tolerated with no complications. Clean dressing placed. EKG interpreted by me  Time 3:34 AM  Normal sinus rhythm rate of 75 with normal axis, normal intervals, no ST elevations or depressions with nonspecific ST-T wave changes. 59-year-old male presents today with syncope that was likely triggered by coughing event. He sustained a head injury with negative CT. Wound repaired as above. No neck pain. Noted to be incidentally anemic although not at the level of requiring transfusion. He is on blood thinners so I perform rectal exam to evaluate for occult blood negative. His wife said he lost a lot of blood with a head injury today 7 this could be the reason for the decreased hemoglobin. No active bleeding currently. Advised to follow-up with PCP in 2 weeks for a recheck of his hemoglobin. Discharged home in stable condition. ICD-10-CM ICD-9-CM    1. Syncope and collapse  R55 780.2    2. Laceration of scalp, initial encounter  S01. 01XA 873.0    3.  Anemia, unspecified type  D64.9 285.9      MEGAN Parson,

## 2022-08-31 ENCOUNTER — TRANSCRIBE ORDER (OUTPATIENT)
Dept: SCHEDULING | Age: 57
End: 2022-08-31

## 2022-08-31 DIAGNOSIS — K63.89 MASS OF COLON: Primary | ICD-10-CM

## 2022-09-06 ENCOUNTER — HOSPITAL ENCOUNTER (OUTPATIENT)
Dept: CT IMAGING | Age: 57
Discharge: HOME | End: 2022-09-06
Attending: INTERNAL MEDICINE
Payer: COMMERCIAL

## 2022-09-06 DIAGNOSIS — K63.89 MASS OF COLON: ICD-10-CM

## 2022-09-06 PROCEDURE — 74177 CT ABD & PELVIS W/CONTRAST: CPT

## 2022-09-06 PROCEDURE — 74011000636 HC RX REV CODE- 636: Performed by: RADIOLOGY

## 2022-09-06 RX ADMIN — IOPAMIDOL 100 ML: 755 INJECTION, SOLUTION INTRAVENOUS at 07:21

## 2023-04-06 ENCOUNTER — HOSPITAL ENCOUNTER (OUTPATIENT)
Age: 58
End: 2023-04-06
Attending: EMERGENCY MEDICINE
Payer: COMMERCIAL

## 2023-04-06 ENCOUNTER — APPOINTMENT (OUTPATIENT)
Dept: CT IMAGING | Age: 58
End: 2023-04-06
Attending: EMERGENCY MEDICINE
Payer: COMMERCIAL

## 2023-04-06 PROCEDURE — 74011000636 HC RX REV CODE- 636: Performed by: EMERGENCY MEDICINE

## 2023-04-06 PROCEDURE — 74177 CT ABD & PELVIS W/CONTRAST: CPT

## 2023-04-06 RX ADMIN — IOPAMIDOL 100 ML: 755 INJECTION, SOLUTION INTRAVENOUS at 22:34

## 2023-04-07 PROBLEM — K35.80 ACUTE APPENDICITIS: Status: ACTIVE | Noted: 2023-04-07

## 2023-04-07 NOTE — ED PROVIDER NOTES
Nahomy Ray is a 60 y.o. male {with a history of ***} who presents to the ED {via EMS} complaining of {}  Constant stabbing rlq pain dry heaves constipation since last night  Loose stool twice today after laxatives    Past Medical History:   Diagnosis Date    Colon cancer ([address]     Hypertension        Past Surgical History:   Procedure Laterality Date    HX ORTHOPAEDIC      shoulder reconstruction         History reviewed. No pertinent family history. Social History     Socioeconomic History    Marital status:      Spouse name: Not on file    Number of children: Not on file    Years of education: Not on file    Highest education level: Not on file   Occupational History    Not on file   Tobacco Use    Smoking status: Never    Smokeless tobacco: Never   Substance and Sexual Activity    Alcohol use: Yes     Comment: social    Drug use: Never    Sexual activity: Not on file   Other Topics Concern    Not on file   Social History Narrative    Not on file     Social Determinants of Health     Financial Resource Strain: Not on file   Food Insecurity: Not on file   Transportation Needs: Not on file   Physical Activity: Not on file   Stress: Not on file   Social Connections: Not on file   Intimate Partner Violence: Not on file   Housing Stability: Not on file         ALLERGIES: Patient has no known allergies. Review of Systems  See HPI for relevant review of systems. Vitals:    04/06/23 2102   BP: 120/70   Pulse: 93   Resp: 18   Temp: 98.6 °F (37 °C)   SpO2: 94%   Weight: 113.3 kg (249 lb 12.5 oz)            Physical Exam     Medical Decision Making  Amount and/or Complexity of Data Reviewed  Labs: ordered. Decision-making details documented in ED Course. Radiology: ordered. Risk  OTC drugs. Prescription drug management. Vital Signs: I independently reviewed the patients vital signs, which were {notable for ***, otherwise***} within normal limits and stable.      Cardiac Monitor/Pulse Oximetry Interpretation:  Cardiac Monitor  Rate: {}  Rhythm: {}  Interpretation: {}normal per Gomez Reaves MD  Pulse Oximetry  O2 source: room air{}  O2 Sat: {}%  Interpretation: Normal per Gomez Reaves MD    Nursing Notes: I independently reviewed and utilized the nursing notes. Old Medical Records: I independently reviewed the patients available external past medical records and past encounters. {Notably:}  {Cite source}    Differential Diagnoses: My differential diagnosis considered included, but was not limited to:   {}    ED Course/Updates:  I independently ordered, and the patient was given:  Medications   acetaminophen (TYLENOL) tablet 1,000 mg (has no administration in time range)   piperacillin-tazobactam (ZOSYN) 4.5 g in 0.9% sodium chloride (MBP/ADV) 100 mL MBP (has no administration in time range)   iopamidoL (ISOVUE-370) 370 mg iodine /mL (76 %) injection 100 mL (100 mL IntraVENous Given 4/6/23 2234)     ED Course as of 04/07/23 0002   u Apr 06, 7236   7825 METABOLIC PANEL, COMPREHENSIVE(!):    Sodium 132(!)   Potassium 3.3(!)   Chloride 95(!)   CO2 24   Anion gap 13   Glucose 230(!)   BUN 14   Creatinine 1.42(!)   BUN/Creatinine ratio 10(!)   eGFR 57(!)   Calcium 8.2(!)   Bilirubin, total 1.5(!)   ALT 19   AST 10(!)   Alk. phosphatase 82   Protein, total 6.9   Albumin 3.4(!)   Globulin 3.5   A-G Ratio 1.0(!) [MY]   6051 METABOLIC PANEL, COMPREHENSIVE(!):    Sodium 132(!)   Potassium 3.3(!)   Chloride 95(!)   CO2 24   Anion gap 13   Glucose 230(!)   BUN 14   Creatinine 1.42(!)   BUN/Creatinine ratio 10(!)   eGFR 57(!)   Calcium 8.2(!)   Bilirubin, total 1.5(!)   ALT 19   AST 10(!)   Alk.  phosphatase 82   Protein, total 6.9   Albumin 3.4(!)   Globulin 3.5   A-G Ratio 1.0(!)  Pseudohyponatremia, mild hypokalemia, hypochloremia, hyperglycemia with normal bicarb and anion gap (not diabetic ketoacidosis (DKA)), acute kidney injury (CHRISTIN)  [MY]   2342 Per radiology- acute appendicitis [MY]      ED Course User Index  [MY] Saima Zambrano MD     Diagnostic laboratory and/or imaging tests were ordered, reviewed and independently interpreted by me, as above. {}     Clinical Impression(s):  1. Acute appendicitis, unspecified acute appendicitis type    2. CHRISTIN (acute kidney injury) ([facility] [address]    3. History of partial colectomy        Disposition:    -------------------------------------------------------------------   Dictation software may have been used to aid in this documentation; dictation errors may exist as a result. Procedures                                 Perfect Serve Consult for Admission  12:00 AM    ED Room Number: SER06/06  Patient Name and age:  Charissa Gomes 60 y.o.  male  Working Diagnosis:   1. Acute appendicitis, unspecified acute appendicitis type    2. CHRISTIN (acute kidney injury) ([facility]    3. History of partial colectomy        COVID-19 Suspicion:  no  Sepsis present:  no  Reassessment needed: yes  Code Status:  Full Code  Readmission: no  Isolation Requirements:  no  Recommended Level of Care:  med/surg  Department: [place] [facility] - [phone]  Admitting Provider: Krystyna Meyer    Other:  ***    Total critical care time spent exclusive of procedures:  *** minutes.

## 2023-04-07 NOTE — ED NOTES
Verbal shift change report given to SHAYLA vidal (oncoming nurse) by Alexandra Castro RN  (offgoing nurse). Report included the following information SBAR, ED Summary, and Recent Results.

## 2023-04-07 NOTE — ED TRIAGE NOTES
Pt complaining of right lower abdominal pain. Had colonoscopy on Friday. Denies nausea, diarrhea.   Complained of constipation but took otc meds and that has been resolved

## 2024-05-09 ENCOUNTER — OFFICE VISIT (OUTPATIENT)
Age: 59
End: 2024-05-09

## 2024-05-09 VITALS
RESPIRATION RATE: 18 BRPM | OXYGEN SATURATION: 97 % | TEMPERATURE: 98.1 F | HEART RATE: 74 BPM | SYSTOLIC BLOOD PRESSURE: 135 MMHG | BODY MASS INDEX: 36.47 KG/M2 | WEIGHT: 275.2 LBS | DIASTOLIC BLOOD PRESSURE: 93 MMHG | HEIGHT: 73 IN

## 2024-05-09 DIAGNOSIS — M79.671 FOOT PAIN, RIGHT: Primary | ICD-10-CM

## 2024-05-09 RX ORDER — CARVEDILOL 6.25 MG/1
6.25 TABLET ORAL 2 TIMES DAILY WITH MEALS
COMMUNITY

## 2024-05-09 RX ORDER — ROSUVASTATIN CALCIUM 10 MG/1
10 TABLET, COATED ORAL DAILY
COMMUNITY

## 2024-05-09 ASSESSMENT — ENCOUNTER SYMPTOMS
NAUSEA: 0
SORE THROAT: 0
VOMITING: 0
DIARRHEA: 0
COUGH: 0

## 2024-05-09 NOTE — PATIENT INSTRUCTIONS
Thank you for visiting [facility] today.    Treatment for foot pain is easy to remember if you think of the word \"RICE\":  REST - To rest the foot, you can use crutches and stay off your feet  ICE - Apply a cold gel pack, bag of ice, or bag of frozen vegetables on your foot every 1 to 2 hours, for 15 minutes each time.  Put a thin towel between the ice (or other cold object) and your skin.  Use the ice (or other cold object) for at least 6 hours after your injury.  Some people find it helpful to ice longer, even up to 2 days after their injury.  COMPRESSION - Compression basically means pressure.  You want to have your foot under slight pressure by having it wrapped in an elastic \"compression\" bandage.  This helps reduce swelling and supports the foot.  Your doctor or nurse will show you how to wrap your foot.  It's important that you do not use too much pressure and cut off the blood flow to your foot.  ELEVATION - \"Elevation\" means you should keep your foot raised up above the level of your heart.  To do this, you can put your foot on some pillows or blankets while you are laying down, or on a table or chair while you are sitting.    -You can also take medicines to relieve pain, such as acetaminophen (Tylenol), ibuprofen (Advil, Motrin) or naproxen (Aleve)    Follow up with orthopaedist or the emergency room if symptoms worsen or persist.

## 2024-05-09 NOTE — PROGRESS NOTES
Subjective     Patient is 59 year old male presenting with rt foot pain.  Symptoms began 1 week ago.  Denies trauma.  Patient has been taking nothing for symptom relief.      Chief Complaint   Patient presents with    Foot Pain     Patient is having right foot pain and swelling. pain started Wednesday- warm to touch, throbbing pain         Foot Pain   The pain is present in the right foot. This is a new problem. The current episode started in the past 7 days. There has been no history of extremity trauma. The problem occurs constantly. The problem has been unchanged. The quality of the pain is described as aching. The pain is mild. Pertinent negatives include no fever, inability to bear weight, itching, joint swelling, numbness or stiffness. The symptoms are aggravated by standing. He has tried nothing for the symptoms.       Past Medical History:   Diagnosis Date    Colon cancer (HCC)     Hypertension     Pulmonary embolism (HCC)        Past Surgical History:   Procedure Laterality Date    ORTHOPEDIC SURGERY      shoulder reconstruction       No family history on file.    Allergies   Allergen Reactions    Lovenox [Enoxaparin] Itching       Social History     Tobacco Use    Smoking status: Never    Smokeless tobacco: Never   Substance Use Topics    Alcohol use: Yes    Drug use: Never       Vitals:    05/09/24 1141   BP: (!) 135/93   Pulse: 74   Resp: 18   Temp: 98.1 °F (36.7 °C)   SpO2: 97%       Review of Systems   Constitutional:  Negative for chills and fever.   Cardiovascular:  Negative for chest pain.   Gastrointestinal:  Negative for nausea and vomiting.   Musculoskeletal:  Negative for stiffness.   Skin:  Negative for itching.   Neurological:  Negative for numbness.       Objective     Physical Exam  Vitals reviewed.   Constitutional:       Appearance: Normal appearance.   Musculoskeletal:         General: Normal range of motion.      Comments: Rt foot swelling dorsal, no erythema/bruising, ROM normal, 5/5

## 2024-05-09 NOTE — PROGRESS NOTES
Subjective     Patient is 59 year old male presenting with Rt foot pain.  Symptoms began 2 days ago.  Denies trauma.  Patient has been taking nothing for symptom relief.      Chief Complaint   Patient presents with    Foot Pain     Patient is having right foot pain and swelling. pain started Wednesday- warm to touch, throbbing pain         Foot Pain   The pain is present in the right foot. This is a new problem. Episode onset: 2 days. There has been no history of extremity trauma. The problem occurs constantly. The problem has been unchanged. The quality of the pain is described as aching. The pain is mild. Pertinent negatives include no fever, inability to bear weight, itching, limited range of motion, numbness or stiffness. The symptoms are aggravated by standing. He has tried nothing for the symptoms.       Past Medical History:   Diagnosis Date    Colon cancer (HCC)     Hypertension     Pulmonary embolism (HCC)        Past Surgical History:   Procedure Laterality Date    ORTHOPEDIC SURGERY      shoulder reconstruction       No family history on file.    Allergies   Allergen Reactions    Lovenox [Enoxaparin] Itching       Social History     Tobacco Use    Smoking status: Never    Smokeless tobacco: Never   Substance Use Topics    Alcohol use: Yes    Drug use: Never       Vitals:    05/09/24 1141   BP: (!) 135/93   Pulse: 74   Resp: 18   Temp: 98.1 °F (36.7 °C)   SpO2: 97%       Review of Systems   Constitutional:  Negative for chills and fever.   HENT:  Negative for congestion and sore throat.    Respiratory:  Negative for cough.    Cardiovascular:  Negative for chest pain.   Gastrointestinal:  Negative for diarrhea, nausea and vomiting.   Musculoskeletal:  Positive for joint swelling. Negative for stiffness.   Skin:  Negative for itching.   Neurological:  Negative for numbness.       Objective     Physical Exam  Vitals reviewed.   Constitutional:       Appearance: Normal appearance.   Cardiovascular:      Rate

## 2024-06-08 ENCOUNTER — OFFICE VISIT (OUTPATIENT)
Age: 59
End: 2024-06-08

## 2024-06-08 VITALS
RESPIRATION RATE: 18 BRPM | BODY MASS INDEX: 37.05 KG/M2 | DIASTOLIC BLOOD PRESSURE: 86 MMHG | OXYGEN SATURATION: 96 % | HEART RATE: 63 BPM | SYSTOLIC BLOOD PRESSURE: 131 MMHG | WEIGHT: 279.6 LBS | TEMPERATURE: 98.7 F | HEIGHT: 73 IN

## 2024-06-08 DIAGNOSIS — H61.22 IMPACTED CERUMEN OF LEFT EAR: Primary | ICD-10-CM

## 2024-06-08 RX ORDER — CAPECITABINE 500 MG/1
2500 TABLET, FILM COATED ORAL 2 TIMES DAILY WITH MEALS
COMMUNITY
Start: 2024-06-13 | End: 2024-06-27

## 2024-06-08 RX ORDER — CARVEDILOL 12.5 MG/1
12.5 TABLET ORAL 2 TIMES DAILY
COMMUNITY
Start: 2024-05-22

## 2024-06-08 RX ORDER — ENOXAPARIN SODIUM 100 MG/ML
INJECTION SUBCUTANEOUS
COMMUNITY
Start: 2024-03-22

## 2024-06-08 NOTE — PATIENT INSTRUCTIONS
If symptoms worsens or fail to improve follow-up with PCP or ER.    Thank you for visiting [facility] today.    -Ibuprofen/Tylenol for pain relief  -Debrox drops as needed  -Do not place foreign objects in your ear (q-tips, fingers, etc.)    Follow up with ENT as needed.

## 2024-06-08 NOTE — PROGRESS NOTES
Linus Clement (:  1965) is a 59 y.o. male,Established patient, here for evaluation of the following chief complaint(s):  Otalgia (Thought he had water in ear and after trying to clean it feels like he has pushed whatever is in there further)      Assessment & Plan :  Visit Diagnoses and Associated Orders       ORDERS WITHOUT AN ASSOCIATED DIAGNOSIS    capecitabine (XELODA) 500 MG chemo tablet [32289]      carvedilol (COREG) 12.5 MG tablet [86351]      enoxaparin (LOVENOX) 40 MG/0.4ML [374088]            Below is the assessment and plan developed based on review of history and  physical exam.    1. Impacted cerumen of left ear  Patient appears well, VSS, afebrile, The patient presents with symptoms of cerumen impaction. Patient is nontoxic appearing and not in need of emergency medical intervention. Left ear irrigation performed successfully. Patient reported relief afterwards.    There is no evidence for significant complications at this time. Patient is alert and completely non-toxic. There is no evidence for tympanic membrane rupture. There is no mastoid swelling. There is no evidence for otitis externa or otitis media based on examination. There is no suggestion of systemic complications or sepsis. Advise if symptoms fail to improve or worsens to follow-up with PCP or ER.  Patient verbalized understanding, no questions or concerns at this time.       - MN REMOVAL IMPACTED CERUMEN IRRIGATION/LVG UNILAT    -Discharged patient with instructions to follow up with ENT for recurrent symptoms  -Advised to go immediately to the ED for worsening or persistent symptoms      1. Handout given with care instructions.     2. OTC for symptom management. Increase fluid intake.     3. Follow-up with PCP as needed.     4. Go to ED with development of any acute symptoms.      Follow-up   Follow-up with PCP or ER immediately for any new worsening or changes or if symptoms are not improving over the next 5-7 days.

## (undated) LAB
ALBUMIN SERPL-MCNC: 3.4 G/DL (ref 3.5–5)
ALBUMIN/GLOB SERPL: 1 (ref 1.1–2.2)
ALP SERPL-CCNC: 82 U/L (ref 45–117)
ALT SERPL-CCNC: 19 U/L (ref 12–78)
ANION GAP SERPL CALC-SCNC: 13 MMOL/L (ref 5–15)
AST SERPL-CCNC: 10 U/L (ref 15–37)
BASOPHILS # BLD: 0 K/UL (ref 0–0.1)
BASOPHILS NFR BLD: 0 % (ref 0–1)
BILIRUB SERPL-MCNC: 1.5 MG/DL (ref 0.2–1)
BUN SERPL-MCNC: 14 MG/DL (ref 6–20)
BUN/CREAT SERPL: 10 (ref 12–20)
CALCIUM SERPL-MCNC: 8.2 MG/DL (ref 8.5–10.1)
CHLORIDE SERPL-SCNC: 95 MMOL/L (ref 97–108)
CO2 SERPL-SCNC: 24 MMOL/L (ref 21–32)
CREAT SERPL-MCNC: 1.42 MG/DL (ref 0.7–1.3)
DIFFERENTIAL METHOD BLD: (no result)
EOSINOPHIL # BLD: 0 K/UL (ref 0–0.4)
EOSINOPHIL NFR BLD: 0 % (ref 0–7)
ERYTHROCYTE [DISTWIDTH] IN BLOOD BY AUTOMATED COUNT: 13 % (ref 11.5–14.5)
GLOBULIN SER CALC-MCNC: 3.5 G/DL (ref 2–4)
GLUCOSE SERPL-MCNC: 230 MG/DL (ref 65–100)
HCT VFR BLD AUTO: 41.3 % (ref 36.6–50.3)
HGB BLD-MCNC: 14.2 G/DL (ref 12.1–17)
IMM GRANULOCYTES # BLD AUTO: 0.2 K/UL
IMM GRANULOCYTES NFR BLD AUTO: 1 %
LIPASE SERPL-CCNC: 75 U/L (ref 73–393)
LYMPHOCYTES # BLD: 0.6 K/UL (ref 0.8–3.5)
LYMPHOCYTES NFR BLD: 4 % (ref 12–49)
MCH RBC QN AUTO: 29 PG (ref 26–34)
MCHC RBC AUTO-ENTMCNC: 34.4 G/DL (ref 30–36.5)
MCV RBC AUTO: 84.3 FL (ref 80–99)
MONOCYTES # BLD: 0.9 K/UL (ref 0–1)
MONOCYTES NFR BLD: 6 % (ref 5–13)
NEUTS BAND NFR BLD MANUAL: 6 % (ref 0–6)
NEUTS SEG # BLD: 13.6 K/UL (ref 1.8–8)
NEUTS SEG NFR BLD: 83 % (ref 32–75)
NRBC # BLD: 0 K/UL (ref 0–0.01)
NRBC BLD-RTO: 0 PER 100 WBC
PLATELET # BLD AUTO: 253 K/UL (ref 150–400)
PMV BLD AUTO: 9.7 FL (ref 8.9–12.9)
POTASSIUM SERPL-SCNC: 3.3 MMOL/L (ref 3.5–5.1)
PROT SERPL-MCNC: 6.9 G/DL (ref 6.4–8.2)
RBC # BLD AUTO: 4.9 M/UL (ref 4.1–5.7)
RBC MORPH BLD: (no result)
SODIUM SERPL-SCNC: 132 MMOL/L (ref 136–145)
WBC # BLD AUTO: 15.3 K/UL (ref 4.1–11.1)